# Patient Record
Sex: FEMALE | Race: WHITE | Employment: FULL TIME | URBAN - METROPOLITAN AREA
[De-identification: names, ages, dates, MRNs, and addresses within clinical notes are randomized per-mention and may not be internally consistent; named-entity substitution may affect disease eponyms.]

---
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
## 2018-04-30 ENCOUNTER — HOSPITAL ENCOUNTER (EMERGENCY)
Facility: CLINIC | Age: 18
Discharge: HOME | End: 2018-04-30
Attending: EMERGENCY MEDICINE | Admitting: EMERGENCY MEDICINE
Payer: COMMERCIAL

## 2018-04-30 ENCOUNTER — APPOINTMENT (OUTPATIENT)
Dept: GENERAL RADIOLOGY | Facility: CLINIC | Age: 18
End: 2018-04-30
Attending: EMERGENCY MEDICINE
Payer: COMMERCIAL

## 2018-04-30 VITALS
HEART RATE: 101 BPM | RESPIRATION RATE: 16 BRPM | TEMPERATURE: 97.8 F | OXYGEN SATURATION: 95 % | DIASTOLIC BLOOD PRESSURE: 81 MMHG | WEIGHT: 112 LBS | SYSTOLIC BLOOD PRESSURE: 121 MMHG | BODY MASS INDEX: 19.84 KG/M2

## 2018-04-30 DIAGNOSIS — R42 DIZZINESS: ICD-10-CM

## 2018-04-30 DIAGNOSIS — R07.89 ATYPICAL CHEST PAIN: ICD-10-CM

## 2018-04-30 LAB
ALBUMIN UR-MCNC: NEGATIVE MG/DL
AMORPH CRY #/AREA URNS HPF: ABNORMAL /HPF
AMPHETAMINES UR QL SCN: NEGATIVE
ANION GAP SERPL CALCULATED.3IONS-SCNC: 7 MMOL/L (ref 3–14)
APPEARANCE UR: ABNORMAL
BACTERIA #/AREA URNS HPF: ABNORMAL /HPF
BARBITURATES UR QL: NEGATIVE
BASOPHILS # BLD AUTO: 0.1 10E9/L (ref 0–0.2)
BASOPHILS NFR BLD AUTO: 0.8 %
BENZODIAZ UR QL: NEGATIVE
BILIRUB UR QL STRIP: NEGATIVE
BUN SERPL-MCNC: 15 MG/DL (ref 7–30)
CALCIUM SERPL-MCNC: 8.5 MG/DL (ref 8.5–10.1)
CANNABINOIDS UR QL SCN: NEGATIVE
CHLORIDE SERPL-SCNC: 106 MMOL/L (ref 94–109)
CO2 SERPL-SCNC: 26 MMOL/L (ref 20–32)
COCAINE UR QL: NEGATIVE
COLOR UR AUTO: ABNORMAL
CREAT SERPL-MCNC: 0.88 MG/DL (ref 0.52–1.04)
DIFFERENTIAL METHOD BLD: NORMAL
EOSINOPHIL # BLD AUTO: 0.1 10E9/L (ref 0–0.7)
EOSINOPHIL NFR BLD AUTO: 1.1 %
ERYTHROCYTE [DISTWIDTH] IN BLOOD BY AUTOMATED COUNT: 13 % (ref 10–15)
ETHANOL UR QL SCN: NEGATIVE
GFR SERPL CREATININE-BSD FRML MDRD: 82 ML/MIN/1.7M2
GLUCOSE SERPL-MCNC: 84 MG/DL (ref 70–99)
GLUCOSE UR STRIP-MCNC: NEGATIVE MG/DL
HCG UR QL: NEGATIVE
HCT VFR BLD AUTO: 40.9 % (ref 35–47)
HGB BLD-MCNC: 14.1 G/DL (ref 11.7–15.7)
HGB UR QL STRIP: ABNORMAL
IMM GRANULOCYTES # BLD: 0 10E9/L (ref 0–0.4)
IMM GRANULOCYTES NFR BLD: 0.1 %
KETONES UR STRIP-MCNC: NEGATIVE MG/DL
LEUKOCYTE ESTERASE UR QL STRIP: ABNORMAL
LYMPHOCYTES # BLD AUTO: 2.5 10E9/L (ref 0.8–5.3)
LYMPHOCYTES NFR BLD AUTO: 30.6 %
MCH RBC QN AUTO: 30.1 PG (ref 26.5–33)
MCHC RBC AUTO-ENTMCNC: 34.5 G/DL (ref 31.5–36.5)
MCV RBC AUTO: 87 FL (ref 78–100)
MONOCYTES # BLD AUTO: 0.6 10E9/L (ref 0–1.3)
MONOCYTES NFR BLD AUTO: 7.7 %
MUCOUS THREADS #/AREA URNS LPF: PRESENT /LPF
NEUTROPHILS # BLD AUTO: 4.9 10E9/L (ref 1.6–8.3)
NEUTROPHILS NFR BLD AUTO: 59.7 %
NITRATE UR QL: NEGATIVE
NRBC # BLD AUTO: 0 10*3/UL
NRBC BLD AUTO-RTO: 0 /100
OPIATES UR QL SCN: NEGATIVE
PH UR STRIP: 7.5 PH (ref 5–7)
PLATELET # BLD AUTO: 357 10E9/L (ref 150–450)
POTASSIUM SERPL-SCNC: 3.9 MMOL/L (ref 3.4–5.3)
RBC # BLD AUTO: 4.68 10E12/L (ref 3.8–5.2)
RBC #/AREA URNS AUTO: 36 /HPF (ref 0–2)
SODIUM SERPL-SCNC: 140 MMOL/L (ref 133–144)
SOURCE: ABNORMAL
SP GR UR STRIP: 1.02 (ref 1–1.03)
SQUAMOUS #/AREA URNS AUTO: 1 /HPF (ref 0–1)
TROPONIN I SERPL-MCNC: <0.015 UG/L (ref 0–0.04)
UROBILINOGEN UR STRIP-MCNC: NORMAL MG/DL (ref 0–2)
WBC # BLD AUTO: 8.3 10E9/L (ref 4–11)
WBC #/AREA URNS AUTO: 10 /HPF (ref 0–5)

## 2018-04-30 PROCEDURE — 81025 URINE PREGNANCY TEST: CPT | Performed by: EMERGENCY MEDICINE

## 2018-04-30 PROCEDURE — 99285 EMERGENCY DEPT VISIT HI MDM: CPT | Performed by: EMERGENCY MEDICINE

## 2018-04-30 PROCEDURE — 71046 X-RAY EXAM CHEST 2 VIEWS: CPT

## 2018-04-30 PROCEDURE — 85025 COMPLETE CBC W/AUTO DIFF WBC: CPT | Performed by: EMERGENCY MEDICINE

## 2018-04-30 PROCEDURE — 25000132 ZZH RX MED GY IP 250 OP 250 PS 637: Performed by: EMERGENCY MEDICINE

## 2018-04-30 PROCEDURE — 84484 ASSAY OF TROPONIN QUANT: CPT | Performed by: EMERGENCY MEDICINE

## 2018-04-30 PROCEDURE — 80307 DRUG TEST PRSMV CHEM ANLYZR: CPT | Performed by: EMERGENCY MEDICINE

## 2018-04-30 PROCEDURE — 99285 EMERGENCY DEPT VISIT HI MDM: CPT | Mod: 25 | Performed by: EMERGENCY MEDICINE

## 2018-04-30 PROCEDURE — 80307 DRUG TEST PRSMV CHEM ANLYZR: CPT | Mod: 59 | Performed by: EMERGENCY MEDICINE

## 2018-04-30 PROCEDURE — 80320 DRUG SCREEN QUANTALCOHOLS: CPT | Mod: 59 | Performed by: EMERGENCY MEDICINE

## 2018-04-30 PROCEDURE — 81001 URINALYSIS AUTO W/SCOPE: CPT | Performed by: EMERGENCY MEDICINE

## 2018-04-30 PROCEDURE — 93010 ELECTROCARDIOGRAM REPORT: CPT | Mod: Z6 | Performed by: EMERGENCY MEDICINE

## 2018-04-30 PROCEDURE — 80048 BASIC METABOLIC PNL TOTAL CA: CPT | Performed by: EMERGENCY MEDICINE

## 2018-04-30 PROCEDURE — 93005 ELECTROCARDIOGRAM TRACING: CPT | Performed by: EMERGENCY MEDICINE

## 2018-04-30 RX ORDER — ACETAMINOPHEN 500 MG
1000 TABLET ORAL ONCE
Status: COMPLETED | OUTPATIENT
Start: 2018-04-30 | End: 2018-04-30

## 2018-04-30 RX ADMIN — ACETAMINOPHEN 1000 MG: 500 TABLET, FILM COATED ORAL at 16:01

## 2018-04-30 ASSESSMENT — ENCOUNTER SYMPTOMS
PALPITATIONS: 1
NERVOUS/ANXIOUS: 1
DIZZINESS: 1
LIGHT-HEADEDNESS: 1

## 2018-04-30 NOTE — ED TRIAGE NOTES
dizzy,chest pain    arrives swaying to traige, denies taking any meds and drugs,  feels heart racing and has intense chest pain     History of depression and anxiety,  Is going to start new therapies but has been off med's since October

## 2018-04-30 NOTE — ED AVS SNAPSHOT
[facility], Emergency Department    [address] [address]    Phone:  [phone]                                       Lady Carey   MRN: [id number]    Department:  [facility], Emergency Department   Date of Visit:  4/30/2018           Patient Information     Date Of Birth          2000        Your diagnoses for this visit were:     Dizziness     Atypical chest pain        You were seen by Salvatore Lynne MD.      Follow-up Information     Follow up with [facility], [facility].    Specialty:  Clinic    Contact information:    [address] [address]          Discharge Instructions          *CHEST PAIN, UNCERTAIN CAUSE    Based on your exam today, the exact cause of your chest pain is not certain. Your condition does not seem serious at this time, and your pain does not appear to be coming from your heart. However, sometimes the signs of a serious problem take more time to appear. Therefore, watch for the warning signs listed below.  HOME CARE:    1. Rest today and avoid strenuous activity.  2. Take any prescribed medicine as directed.  FOLLOW UP with your doctor in 1-3 days.   GET PROMPT MEDICAL ATTENTION if any of the following occur:    A change in the type of pain: if it feels different, becomes more severe, lasts longer, or begins to spread into your shoulder, arm, neck, jaw or back    Shortness of breath or increased pain with breathing    Weakness, dizziness, or fainting    Cough with blood or dark colored sputum (phlegm)    Fever over 101  F (38.3  C)    Swelling, pain or redness in one leg    3114-9896 [employer]. [address] [place] [address], [place] [address]. All rights reserved. This information is not intended as a substitute for professional medical care. Always follow your healthcare professional's instructions.  This information has been modified by your health care provider with permission from the publisher.      Discharge  References/Attachments     DIZZINESS, UNCERTAIN CAUSE (ENGLISH)    DIZZINESS OR FAINTING, POSSIBLE CAUSES OF (ENGLISH)      24 Hour Appointment Hotline       To make an appointment at any [facility], call [phone] ([phone]). If you don't have a family doctor or clinic, we will help you find one. [place] clinics are conveniently located to serve the needs of you and your family.             Review of your medicines      Our records show that you are taking the medicines listed below. If these are incorrect, please call your family doctor or clinic.        Dose / Directions Last dose taken    ARIPiprazole 2 MG tablet   Commonly known as:  ABILIFY   Dose:  2.5 mg   Quantity:  45 tablet        Take 1.5 tablets (3 mg) by mouth daily   Refills:  1        fluvoxaMINE 100 MG tablet   Commonly known as:  LUVOX   Dose:  100 mg   Indication:  Depression   Quantity:  30 tablet        Take 1 tablet (100 mg) by mouth At Bedtime   Refills:  1        GABAPENTIN PO   Indication:  Does not know dose        Take by mouth At Bedtime   Refills:  0        hydrOXYzine 25 MG tablet   Commonly known as:  ATARAX   Dose:  25-50 mg   Quantity:  120 tablet        Take 1-2 tablets (25-50 mg) by mouth every 4 hours as needed for anxiety   Refills:  1        traZODone 50 MG tablet   Commonly known as:  DESYREL   Dose:  50 mg   Quantity:  90 tablet        Take 1 tablet (50 mg) by mouth At Bedtime   Refills:  1                Procedures and tests performed during your visit     Basic metabolic panel    CBC with platelets differential    Drug abuse screen 6 urine (tox)    EKG 12 lead    HCG qualitative urine    Orthostatic blood pressure and pulse    Troponin I    UA with Microscopic reflex to Culture    XR Chest 2 Views      Orders Needing Specimen Collection     None      Pending Results     Date and Time Order Name Status Description    4/30/2018 1535 Drug abuse screen 6 urine (tox) In process     4/30/2018 1427 XR Chest 2 Views  "Preliminary     2018 1326 EKG 12 lead Preliminary             Pending Culture Results     Date and Time Order Name Status Description    2018 1535 Drug abuse screen 6 urine (tox) In process             Pending Results Instructions     If you had any lab results that were not finalized at the time of your Discharge, you can call the ED Lab Result RN at [phone]. You will be contacted by this team for any positive Lab results or changes in treatment. The nurses are available 7 days a week from 10A to 6:30P.  You can leave a message 24 hours per day and they will return your call.        Thank you for choosing [facility]       Thank you for choosing [facility] for your care. Our goal is always to provide you with excellent care. Hearing back from our patients is one way we can continue to improve our services. Please take a few minutes to complete the written survey that you may receive in the mail after you visit with us. Thank you!        [employer] Information     [employer] lets you send messages to your doctor, view your test results, renew your prescriptions, schedule appointments and more. To sign up, go to [email or web address] . Click on \"Log in\" on the left side of the screen, which will take you to the Welcome page. Then click on \"Sign up Now\" on the right side of the page.     You will be asked to enter the access code listed below, as well as some personal information. Please follow the directions to create your username and password.     Your access code is: [email or web address]  Expires: 2018  5:13 PM     Your access code will  in 90 days. If you need help or a new code, please call your [facility] clinic or [phone].        Care EveryWhere ID     This is your Care EveryWhere ID. This could be used by other organizations to access your [facility] medical records  [id number]        Equal Access to Services     CLAUDIO WRIGHT AH: Yasmany Phelps, vladislav rooney, christina arroyo " abimael davis ah. So [facility] [phone].    ATENCIÓN: Si habla español, tiene a fu disposición servicios gratuitos de asistencia lingüística. Llame al [phone].    We comply with applicable federal civil rights laws and [place] laws. We do not discriminate on the basis of race, color, national origin, age, disability, sex, sexual orientation, or gender identity.            After Visit Summary       This is your record. Keep this with you and show to your community pharmacist(s) and doctor(s) at your next visit.

## 2018-04-30 NOTE — PROGRESS NOTES
"Emergency Social Work Services Note    Date of  Intervention: 04/30/18  Last Emergency Department Visit:  09/24/2017  Care Plan:  None  Collaborated with:  Bedside RN    Data:  JENNIFER consulted by bedside RN to speak with patient about insurance concerns.     Intervention:  JENNIFER met with patient and patient's partner at bedside in regard to insurance concern.     Assessment:  Patient is a 18 year old woman who presents to the ED with elevated heart rate and chest pain. RN requested SW to meet with patient due to financial concerns for ED visit. JENNIFER inquired into insurance with patient; she states she has insurance through the [employer] as she is a student. She is confirmed to have [place] of [place]. She expressed anxiety with co-pays and bills as she has been admitted to the hospital before, and she had \"a lot of medical bills\". SW discussed that there is a possibility with co-pays through insurance, but she does have insurance coverage.    JENNIFER encouraged her to speak with insurance company if any co pays are billed. She is agreeable to that plan.    Plan:    Anticipated Disposition:  Home, no needs identified    Barriers to d/c plan:  None    Follow Up:  Patient is encouraged to follow up with insurance company if copays are billed; as well as working out a payment plan if applicable.    Daphne RODRIGUEZ  4/30/2018 4:40 PM         "

## 2018-04-30 NOTE — ED PROVIDER NOTES
"    [facility] EMERGENCY DEPARTMENT ([facility])  4/30/18   Portalway F    History       Chief Complaint   Patient presents with     Chest Pain     HPI  Lady Carey is a 18 year old female with history of depression and anxiety who presents with centralized chest pain and palpitations.  Patient is here with her significant other.  She states  today at noon, she was walking to class with her significant other when she felt dizzy and lightheaded and was noted by her significant other to have an unsteady gait.  She denies any loss of consciousness.  She also had some palpitations and centralized chest pain associated with this which she describes as pressure like.  She states nothing makes the pain better or worse.  This chest pain is different from her typical brief episodes of chest pain which she describes as sharp and lasting approximately a couple of minutes.  She states that she has had these brief episodes of chest pain since she was a child.  She also reports she had similar near syncopal symptoms for a full week last fall, saw a doctor, and was unable to determine the etiology of her symptoms.  She states she does have a hormonal IUD in place, but denies any previous history of cigarette smoking or DVT/PE.  She states she is unsure if she has a family history of DVT/PE.  She states she currently feels like \"she does not exist like her brain is not there.\"  She otherwise currently denies any leg swelling or taking any analgesics for chest pain.    PAST MEDICAL HISTORY  Past Medical History:   Diagnosis Date     Anxiety      Depressive disorder      PAST SURGICAL HISTORY  History reviewed. No pertinent surgical history.  FAMILY HISTORY  No family history on file.  SOCIAL HISTORY  Social History   Substance Use Topics     Smoking status: Never Smoker     Smokeless tobacco: Never Used     Alcohol use 0.6 oz/week     1 Shots of liquor per week      Comment: 4-6 drinks/month     MEDICATIONS  No current " facility-administered medications for this encounter.      Current Outpatient Prescriptions   Medication     ARIPiprazole (ABILIFY) 2 MG tablet     fluvoxaMINE (LUVOX) 100 MG tablet     GABAPENTIN PO     hydrOXYzine (ATARAX) 25 MG tablet     traZODone (DESYREL) 50 MG tablet     ALLERGIES  Allergies   Allergen Reactions     Augmentin Rash         I have reviewed the Medications, Allergies, Past Medical and Surgical History, and Social History in the Epic system.    Review of Systems   Cardiovascular: Positive for chest pain (centralized) and palpitations. Negative for leg swelling.   Neurological: Positive for dizziness and light-headedness. Negative for syncope.   Psychiatric/Behavioral: The patient is nervous/anxious.    All other systems reviewed and are negative.      Physical Exam   BP: 120/70  Pulse: 101  Heart Rate: 53  Temp: 97.8  F (36.6  C)  Resp: 18  Weight: 50.8 kg (112 lb)  SpO2: 97 %  Lying Orthostatic BP: 104/76  Lying Orthostatic Pulse: 87 bpm  Sitting Orthostatic BP: 114/73  Sitting Orthostatic Pulse: 91 bpm  Standing Orthostatic BP: 109/74  Standing Orthostatic Pulse: 103 bpm      Physical Exam   Constitutional: No distress.   HENT:   Head: Atraumatic.   Mouth/Throat: Oropharynx is clear and moist. No oropharyngeal exudate.   Eyes: Pupils are equal, round, and reactive to light. No scleral icterus.   Cardiovascular: Normal heart sounds and intact distal pulses.    Pulmonary/Chest: Breath sounds normal. No respiratory distress.   Abdominal: Soft. Bowel sounds are normal. There is no tenderness.   Musculoskeletal: She exhibits no edema or tenderness.   Skin: Skin is warm. No rash noted. She is not diaphoretic.       ED Course     ED Course     Procedures   2:05 PM  The patient was seen and examined by Dr. Lynne in [facility] .                EKG Interpretation:      Interpreted by Salvatore Lynne  Time reviewed: 1:44 PM  Symptoms at time of EKG: Chest pain, dizziness   Rhythm: normal sinus   Rate:  normal  Axis: normal  Ectopy: none  Conduction: normal  ST Segments/ T Waves: No ST-T wave changes  Q Waves: none  Comparison to prior: No old EKG available    Clinical Impression: normal EKG    Results for orders placed or performed during the hospital encounter of 04/30/18 (from the past 48 hour(s))   EKG 12 lead   Result Value Ref Range    Interpretation ECG Click View Image link to view waveform and result    CBC with platelets differential   Result Value Ref Range    WBC 8.3 4.0 - 11.0 10e9/L    RBC Count 4.68 3.8 - 5.2 10e12/L    Hemoglobin 14.1 11.7 - 15.7 g/dL    Hematocrit 40.9 35.0 - 47.0 %    MCV 87 78 - 100 fl    MCH 30.1 26.5 - 33.0 pg    MCHC 34.5 31.5 - 36.5 g/dL    RDW 13.0 10.0 - 15.0 %    Platelet Count 357 150 - 450 10e9/L    Diff Method Automated Method     % Neutrophils 59.7 %    % Lymphocytes 30.6 %    % Monocytes 7.7 %    % Eosinophils 1.1 %    % Basophils 0.8 %    % Immature Granulocytes 0.1 %    Nucleated RBCs 0 0 /100    Absolute Neutrophil 4.9 1.6 - 8.3 10e9/L    Absolute Lymphocytes 2.5 0.8 - 5.3 10e9/L    Absolute Monocytes 0.6 0.0 - 1.3 10e9/L    Absolute Eosinophils 0.1 0.0 - 0.7 10e9/L    Absolute Basophils 0.1 0.0 - 0.2 10e9/L    Abs Immature Granulocytes 0.0 0 - 0.4 10e9/L    Absolute Nucleated RBC 0.0    Basic metabolic panel   Result Value Ref Range    Sodium 140 133 - 144 mmol/L    Potassium 3.9 3.4 - 5.3 mmol/L    Chloride 106 94 - 109 mmol/L    Carbon Dioxide 26 20 - 32 mmol/L    Anion Gap 7 3 - 14 mmol/L    Glucose 84 70 - 99 mg/dL    Urea Nitrogen 15 7 - 30 mg/dL    Creatinine 0.88 0.52 - 1.04 mg/dL    GFR Estimate 82 >60 mL/min/1.7m2    GFR Estimate If Black >90 >60 mL/min/1.7m2    Calcium 8.5 8.5 - 10.1 mg/dL   Troponin I   Result Value Ref Range    Troponin I ES <0.015 0.000 - 0.045 ug/L   XR Chest 2 Views    Impression    Impression: No acute cardiopulmonary abnormality.   HCG qualitative urine   Result Value Ref Range    HCG Qual Urine Negative NEG^Negative   UA with  Microscopic reflex to Culture   Result Value Ref Range    Color Urine Light Yellow     Appearance Urine Slightly Cloudy     Glucose Urine Negative NEG^Negative mg/dL    Bilirubin Urine Negative NEG^Negative    Ketones Urine Negative NEG^Negative mg/dL    Specific Gravity Urine 1.016 1.003 - 1.035    Blood Urine Moderate (A) NEG^Negative    pH Urine 7.5 (H) 5.0 - 7.0 pH    Protein Albumin Urine Negative NEG^Negative mg/dL    Urobilinogen mg/dL Normal 0.0 - 2.0 mg/dL    Nitrite Urine Negative NEG^Negative    Leukocyte Esterase Urine Small (A) NEG^Negative    Source Urine     WBC Urine 10 (H) 0 - 5 /HPF    RBC Urine 36 (H) 0 - 2 /HPF    Bacteria Urine Few (A) NEG^Negative /HPF    Squamous Epithelial /HPF Urine 1 0 - 1 /HPF    Mucous Urine Present (A) NEG^Negative /LPF    Amorphous Crystals Few (A) NEG^Negative /HPF           Critical Care time:  none             Labs Ordered and Resulted from Time of ED Arrival Up to the Time of Departure from the ED   ROUTINE UA WITH MICROSCOPIC REFLEX TO CULTURE - Abnormal; Notable for the following:        Result Value    Blood Urine Moderate (*)     pH Urine 7.5 (*)     Leukocyte Esterase Urine Small (*)     WBC Urine 10 (*)     RBC Urine 36 (*)     Bacteria Urine Few (*)     Mucous Urine Present (*)     Amorphous Crystals Few (*)     All other components within normal limits   CBC WITH PLATELETS DIFFERENTIAL   BASIC METABOLIC PANEL   TROPONIN I   HCG QUALITATIVE URINE   DRUG ABUSE SCREEN 6 CHEM DEP URINE ([facility])   ORTHOSTATIC BLOOD PRESSURE AND PULSE   ICE TO AFFECTED AREA            Assessments & Plan (with Medical Decision Making)   18-year-old female presents for evaluation of dizziness in the setting of chest pain and palpitations.  Differential includes acute coronary syndrome, arrhythmia, pulmonary embolus, other.  Initial exam revealed entirely normal vital signs.  Laboratories including CBC, basic metabolic panel and troponin were unremarkable.  Chest x-ray and EKG were  normal.  Urinalysis was normal and urine toxicology renewed revealed alcohol.  She was PERC negative without risk factors for pulmonary embolus making pulmonary embolus unlikely.  Given normal EKG and troponin as well as normal sinus rhythm observed on monitor for multiple hours, was felt that cardiac etiology for his symptoms was less likely as well.  Patient was observed multiple hours and felt improved.  She will be discharged with instructions on symptomatic treatment and to follow-up with the primary care providers.    I have reviewed the nursing notes.    I have reviewed the findings, diagnosis, plan and need for follow up with the patient.    New Prescriptions    No medications on file       Final diagnoses:   Dizziness   Atypical chest pain     IDelgado, am serving as a trained medical scribe to document services personally performed by Dontae Lynne MD, based on the provider's statements to me.      IDontae MD, was physically present and have reviewed and verified the accuracy of this note documented by Delgado Gann.     4/30/2018   [facility], [place], EMERGENCY DEPARTMENT     Salvatore Lynne MD  04/30/18 4048

## 2018-04-30 NOTE — ED AVS SNAPSHOT
[facility], [place], Emergency Department    [address] [place] [address]    Phone:  [phone]                                       Lady Carey   MRN: [id number]    Department:  [facility], Emergency Department   Date of Visit:  4/30/2018           After Visit Summary Signature Page     I have received my discharge instructions, and my questions have been answered. I have discussed any challenges I see with this plan with the nurse or doctor.    ..........................................................................................................................................  Patient/Patient Representative Signature      ..........................................................................................................................................  Patient Representative Print Name and Relationship to Patient    ..................................................               ................................................  Date                                            Time    ..........................................................................................................................................  Reviewed by Signature/Title    ...................................................              ..............................................  Date                                                            Time

## 2018-04-30 NOTE — DISCHARGE INSTRUCTIONS
*CHEST PAIN, UNCERTAIN CAUSE    Based on your exam today, the exact cause of your chest pain is not certain. Your condition does not seem serious at this time, and your pain does not appear to be coming from your heart. However, sometimes the signs of a serious problem take more time to appear. Therefore, watch for the warning signs listed below.  HOME CARE:    1. Rest today and avoid strenuous activity.  2. Take any prescribed medicine as directed.  FOLLOW UP with your doctor in 1-3 days.   GET PROMPT MEDICAL ATTENTION if any of the following occur:    A change in the type of pain: if it feels different, becomes more severe, lasts longer, or begins to spread into your shoulder, arm, neck, jaw or back    Shortness of breath or increased pain with breathing    Weakness, dizziness, or fainting    Cough with blood or dark colored sputum (phlegm)    Fever over 101  F (38.3  C)    Swelling, pain or redness in one leg    8781-7538 [employer]. [address] [place] [address]. All rights reserved. This information is not intended as a substitute for professional medical care. Always follow your healthcare professional's instructions.  This information has been modified by your health care provider with permission from the publisher.

## 2018-05-01 LAB — INTERPRETATION ECG - MUSE: NORMAL

## 2020-06-24 ENCOUNTER — APPOINTMENT (OUTPATIENT)
Dept: CT IMAGING | Facility: CLINIC | Age: 20
End: 2020-06-24
Attending: EMERGENCY MEDICINE
Payer: COMMERCIAL

## 2020-06-24 ENCOUNTER — HOSPITAL ENCOUNTER (EMERGENCY)
Facility: CLINIC | Age: 20
Discharge: HOME | End: 2020-06-24
Attending: EMERGENCY MEDICINE | Admitting: EMERGENCY MEDICINE
Payer: COMMERCIAL

## 2020-06-24 VITALS
OXYGEN SATURATION: 100 % | DIASTOLIC BLOOD PRESSURE: 82 MMHG | HEART RATE: 118 BPM | SYSTOLIC BLOOD PRESSURE: 124 MMHG | TEMPERATURE: 98.5 F

## 2020-06-24 DIAGNOSIS — N12 PYELONEPHRITIS: ICD-10-CM

## 2020-06-24 LAB
ALBUMIN SERPL-MCNC: 3.5 G/DL (ref 3.4–5)
ALBUMIN UR-MCNC: NEGATIVE MG/DL
ALP SERPL-CCNC: 68 U/L (ref 40–150)
ALT SERPL W P-5'-P-CCNC: 23 U/L (ref 0–50)
ANION GAP SERPL CALCULATED.3IONS-SCNC: 9 MMOL/L (ref 3–14)
APPEARANCE UR: CLEAR
AST SERPL W P-5'-P-CCNC: 21 U/L (ref 0–45)
B-HCG FREE SERPL-ACNC: <5 IU/L
BACTERIA #/AREA URNS HPF: ABNORMAL /HPF
BASOPHILS # BLD AUTO: 0 10E9/L (ref 0–0.2)
BASOPHILS NFR BLD AUTO: 0.2 %
BILIRUB SERPL-MCNC: 0.6 MG/DL (ref 0.2–1.3)
BILIRUB UR QL STRIP: NEGATIVE
BUN SERPL-MCNC: 9 MG/DL (ref 7–30)
CALCIUM SERPL-MCNC: 9.2 MG/DL (ref 8.5–10.1)
CHLORIDE SERPL-SCNC: 99 MMOL/L (ref 94–109)
CO2 SERPL-SCNC: 22 MMOL/L (ref 20–32)
COLOR UR AUTO: ABNORMAL
CREAT SERPL-MCNC: 0.89 MG/DL (ref 0.52–1.04)
DIFFERENTIAL METHOD BLD: ABNORMAL
EOSINOPHIL # BLD AUTO: 0 10E9/L (ref 0–0.7)
EOSINOPHIL NFR BLD AUTO: 0 %
ERYTHROCYTE [DISTWIDTH] IN BLOOD BY AUTOMATED COUNT: 13 % (ref 10–15)
GFR SERPL CREATININE-BSD FRML MDRD: >90 ML/MIN/{1.73_M2}
GLUCOSE SERPL-MCNC: 163 MG/DL (ref 70–99)
GLUCOSE UR STRIP-MCNC: 50 MG/DL
HCT VFR BLD AUTO: 42.4 % (ref 35–47)
HGB BLD-MCNC: 13.9 G/DL (ref 11.7–15.7)
HGB UR QL STRIP: ABNORMAL
IMM GRANULOCYTES # BLD: 0.2 10E9/L (ref 0–0.4)
IMM GRANULOCYTES NFR BLD: 1.4 %
KETONES UR STRIP-MCNC: NEGATIVE MG/DL
LACTATE BLD-SCNC: 1.5 MMOL/L (ref 0.7–2)
LEUKOCYTE ESTERASE UR QL STRIP: ABNORMAL
LIPASE SERPL-CCNC: 35 U/L (ref 73–393)
LYMPHOCYTES # BLD AUTO: 0.3 10E9/L (ref 0.8–5.3)
LYMPHOCYTES NFR BLD AUTO: 2.1 %
MCH RBC QN AUTO: 30.4 PG (ref 26.5–33)
MCHC RBC AUTO-ENTMCNC: 32.8 G/DL (ref 31.5–36.5)
MCV RBC AUTO: 93 FL (ref 78–100)
MONOCYTES # BLD AUTO: 1.1 10E9/L (ref 0–1.3)
MONOCYTES NFR BLD AUTO: 6.7 %
NEUTROPHILS # BLD AUTO: 14.3 10E9/L (ref 1.6–8.3)
NEUTROPHILS NFR BLD AUTO: 89.6 %
NITRATE UR QL: NEGATIVE
NRBC # BLD AUTO: 0 10*3/UL
NRBC BLD AUTO-RTO: 0 /100
PH UR STRIP: 6.5 PH (ref 5–7)
PLATELET # BLD AUTO: 278 10E9/L (ref 150–450)
POTASSIUM SERPL-SCNC: 3.5 MMOL/L (ref 3.4–5.3)
PROT SERPL-MCNC: 8.2 G/DL (ref 6.8–8.8)
RBC # BLD AUTO: 4.57 10E12/L (ref 3.8–5.2)
RBC #/AREA URNS AUTO: <1 /HPF (ref 0–2)
SODIUM SERPL-SCNC: 130 MMOL/L (ref 133–144)
SOURCE: ABNORMAL
SP GR UR STRIP: 1.02 (ref 1–1.03)
SQUAMOUS #/AREA URNS AUTO: 3 /HPF (ref 0–1)
UROBILINOGEN UR STRIP-MCNC: NORMAL MG/DL (ref 0–2)
WBC # BLD AUTO: 15.9 10E9/L (ref 4–11)
WBC #/AREA URNS AUTO: 17 /HPF (ref 0–5)

## 2020-06-24 PROCEDURE — 93005 ELECTROCARDIOGRAM TRACING: CPT

## 2020-06-24 PROCEDURE — 81001 URINALYSIS AUTO W/SCOPE: CPT | Performed by: EMERGENCY MEDICINE

## 2020-06-24 PROCEDURE — 84702 CHORIONIC GONADOTROPIN TEST: CPT

## 2020-06-24 PROCEDURE — 83690 ASSAY OF LIPASE: CPT | Performed by: EMERGENCY MEDICINE

## 2020-06-24 PROCEDURE — 96375 TX/PRO/DX INJ NEW DRUG ADDON: CPT

## 2020-06-24 PROCEDURE — 25000128 H RX IP 250 OP 636: Performed by: EMERGENCY MEDICINE

## 2020-06-24 PROCEDURE — 25800030 ZZH RX IP 258 OP 636: Performed by: EMERGENCY MEDICINE

## 2020-06-24 PROCEDURE — 85025 COMPLETE CBC W/AUTO DIFF WBC: CPT | Performed by: EMERGENCY MEDICINE

## 2020-06-24 PROCEDURE — 83605 ASSAY OF LACTIC ACID: CPT | Performed by: EMERGENCY MEDICINE

## 2020-06-24 PROCEDURE — 87086 URINE CULTURE/COLONY COUNT: CPT | Performed by: EMERGENCY MEDICINE

## 2020-06-24 PROCEDURE — 74177 CT ABD & PELVIS W/CONTRAST: CPT

## 2020-06-24 PROCEDURE — 25000125 ZZHC RX 250: Performed by: EMERGENCY MEDICINE

## 2020-06-24 PROCEDURE — 80053 COMPREHEN METABOLIC PANEL: CPT | Performed by: EMERGENCY MEDICINE

## 2020-06-24 PROCEDURE — 87186 SC STD MICRODIL/AGAR DIL: CPT | Performed by: EMERGENCY MEDICINE

## 2020-06-24 PROCEDURE — 99285 EMERGENCY DEPT VISIT HI MDM: CPT | Mod: 25

## 2020-06-24 PROCEDURE — 87040 BLOOD CULTURE FOR BACTERIA: CPT | Mod: XS | Performed by: EMERGENCY MEDICINE

## 2020-06-24 PROCEDURE — 96365 THER/PROPH/DIAG IV INF INIT: CPT

## 2020-06-24 PROCEDURE — 87088 URINE BACTERIA CULTURE: CPT | Performed by: EMERGENCY MEDICINE

## 2020-06-24 PROCEDURE — 96361 HYDRATE IV INFUSION ADD-ON: CPT

## 2020-06-24 RX ORDER — ONDANSETRON 4 MG/1
4 TABLET, ORALLY DISINTEGRATING ORAL EVERY 8 HOURS PRN
Qty: 10 TABLET | Refills: 0 | Status: SHIPPED | OUTPATIENT
Start: 2020-06-24 | End: 2020-08-11

## 2020-06-24 RX ORDER — KETOROLAC TROMETHAMINE 15 MG/ML
15 INJECTION, SOLUTION INTRAMUSCULAR; INTRAVENOUS ONCE
Status: COMPLETED | OUTPATIENT
Start: 2020-06-24 | End: 2020-06-24

## 2020-06-24 RX ORDER — IOPAMIDOL 755 MG/ML
500 INJECTION, SOLUTION INTRAVASCULAR ONCE
Status: COMPLETED | OUTPATIENT
Start: 2020-06-24 | End: 2020-06-24

## 2020-06-24 RX ORDER — CEPHALEXIN 500 MG/1
500 CAPSULE ORAL 4 TIMES DAILY
Qty: 52 CAPSULE | Refills: 0 | Status: SHIPPED | OUTPATIENT
Start: 2020-06-25 | End: 2020-08-11

## 2020-06-24 RX ORDER — CEFTRIAXONE 1 G/1
1 INJECTION, POWDER, FOR SOLUTION INTRAMUSCULAR; INTRAVENOUS ONCE
Status: COMPLETED | OUTPATIENT
Start: 2020-06-24 | End: 2020-06-24

## 2020-06-24 RX ADMIN — CEFTRIAXONE 1 G: 1 INJECTION, POWDER, FOR SOLUTION INTRAMUSCULAR; INTRAVENOUS at 21:02

## 2020-06-24 RX ADMIN — SODIUM CHLORIDE 500 ML: 9 INJECTION, SOLUTION INTRAVENOUS at 21:03

## 2020-06-24 RX ADMIN — IOPAMIDOL 57 ML: 755 INJECTION, SOLUTION INTRAVENOUS at 20:03

## 2020-06-24 RX ADMIN — SODIUM CHLORIDE 1000 ML: 9 INJECTION, SOLUTION INTRAVENOUS at 19:26

## 2020-06-24 RX ADMIN — SODIUM CHLORIDE 54 ML: 9 INJECTION, SOLUTION INTRAVENOUS at 20:05

## 2020-06-24 RX ADMIN — KETOROLAC TROMETHAMINE 15 MG: 15 INJECTION, SOLUTION INTRAMUSCULAR; INTRAVENOUS at 19:27

## 2020-06-24 ASSESSMENT — ENCOUNTER SYMPTOMS
COUGH: 0
HEMATURIA: 0
BLOOD IN STOOL: 0
SHORTNESS OF BREATH: 1
FEVER: 0
DYSURIA: 0
ABDOMINAL PAIN: 1
DIARRHEA: 0
VOMITING: 1
FREQUENCY: 0

## 2020-06-24 NOTE — LETTER
June 24, 2020      To Whom It May Concern:      Phil Quezada was seen in our Emergency Department today, 06/24/20.  I expect her condition to improve over the next 2 days.  She may return to work when improved.        Sincerely,        Zeyad Reynolds RN
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
## 2020-06-24 NOTE — ED TRIAGE NOTES
"Monday at work, started feeling abdominal pain in front and back. The next day felt weak, vomiting and abdominal pain continued, but started feeling better. Was tested for COVID that day, a/w results. Today, woke up feeling \"3 times worse and I was Drenched in sweat and the entire right side of my body felt like it was on fire. Head feels like a bunch of lettuce from [employer] that on fire.\" Sore throat after throwing up on Tuesday, but now just feels dry.   "

## 2020-06-24 NOTE — LETTER
June 24, 2020      To Whom It May Concern:      Phil Quezada was seen in our Emergency Department today, 06/24/20.  I expect her condition to improve over the next 2 days.  She may return to work/school when improved.      Sincerely,        Zeyad Reynolds RN

## 2020-06-24 NOTE — ED AVS SNAPSHOT
[facility] Emergency Department  [address]  [facility] [address]  Phone:  [phone]  Fax:  [phone]                                    Phil Quezada   MRN: [id number]    Department:  [facility] Emergency Department   Date of Visit:  6/24/2020           After Visit Summary Signature Page    I have received my discharge instructions, and my questions have been answered. I have discussed any challenges I see with this plan with the nurse or doctor.    ..........................................................................................................................................  Patient/Patient Representative Signature      ..........................................................................................................................................  Patient Representative Print Name and Relationship to Patient    ..................................................               ................................................  Date                                   Time    ..........................................................................................................................................  Reviewed by Signature/Title    ...................................................              ..............................................  Date                                               Time          22EPIC Rev 08/18

## 2020-06-25 LAB — INTERPRETATION ECG - MUSE: NORMAL

## 2020-06-25 NOTE — RESULT ENCOUNTER NOTE
Emergency Dept/Urgent Care discharge antibiotic (if prescribed): Cephalexin (Keflex) 500 mg capsule, 1 capsule (500 mg) by mouth 4 times daily for 13 days.  Date of Rx (if applicable):  6/24/20  No changes in treatment per Urine culture protocol.

## 2020-06-26 LAB
BACTERIA SPEC CULT: ABNORMAL
Lab: ABNORMAL
SPECIMEN SOURCE: ABNORMAL

## 2020-06-30 LAB
BACTERIA SPEC CULT: NO GROWTH
BACTERIA SPEC CULT: NO GROWTH
SPECIMEN SOURCE: NORMAL
SPECIMEN SOURCE: NORMAL

## 2020-07-01 ENCOUNTER — TELEPHONE (OUTPATIENT)
Dept: EMERGENCY MEDICINE | Facility: CLINIC | Age: 20
End: 2020-07-01

## 2020-07-01 NOTE — TELEPHONE ENCOUNTER
"[facility] Emergency Department/Urgent Care Lab result notification [Positive for uti and bacteria is susceptible to antibiotic]:    Reason for call:   Notify of Final urine culture result, confirm patient is taking antibiotic, assess symptoms, and advise per Emergency Dept/Urgent Care discharge instructions and Emergency Dept urine culture protocol.    Lab Result & Date of Final Report [copied from Result Note]:    Final Urine Culture Report on 6/26/20   Emergency Dept/Urgent Care discharge antibiotic prescribed: Cephalexin (Keflex) 500 mg capsule, 1 capsule (500 mg) by mouth 4 times daily for 13 days.   #1. Bacteria, 50,000 to 100,000 colonies/ml Escherichia coli, is SUSCEPTIBLE to Antibiotic.     As per [facility] ED Lab Result protocol, no change in antibiotic therapy.     Current symptoms (include time patient called):    4:07PM; Spoke with patient. She states all of her symptoms are gone, just has some \"lingering bit of kidney pain.\" States she is about 1/2 way through her antibiotic.     Recommendations/Instructions:   Patient notified of lab result and treatment recommendation.   Take antibiotic as directed by the Emergency Dept/Urgent Care Provider.  Advised to follow up with the PCP as directed by the ED provider.  The patient is comfortable with the information given and has no further questions.     UTI prevention/education reviewed with patient [Yes/No]:  Yes    Please contact you PCP or return to the Emergency Department if your:    Symptoms worsen or other concerning symptoms    Starla Kessler RN  [employer] RN  Lung Nodule and ED Lab Result RN  Epic pool (ED late result f/u RN): [id number]  [facility] INCIDENTAL RADIOLOGY F/U NURSES: [id number]  # [phone]            "

## 2020-08-11 ENCOUNTER — HOSPITAL ENCOUNTER (EMERGENCY)
Facility: CLINIC | Age: 20
End: 2020-08-11
Payer: COMMERCIAL

## 2020-08-11 ENCOUNTER — HOSPITAL ENCOUNTER (EMERGENCY)
Facility: CLINIC | Age: 20
Discharge: HOME | End: 2020-08-11
Attending: EMERGENCY MEDICINE | Admitting: EMERGENCY MEDICINE
Payer: COMMERCIAL

## 2020-08-11 VITALS
OXYGEN SATURATION: 98 % | HEART RATE: 98 BPM | SYSTOLIC BLOOD PRESSURE: 150 MMHG | RESPIRATION RATE: 16 BRPM | DIASTOLIC BLOOD PRESSURE: 109 MMHG | TEMPERATURE: 98.2 F

## 2020-08-11 DIAGNOSIS — K20.90 ESOPHAGITIS: ICD-10-CM

## 2020-08-11 PROCEDURE — 99283 EMERGENCY DEPT VISIT LOW MDM: CPT

## 2020-08-11 PROCEDURE — 93005 ELECTROCARDIOGRAM TRACING: CPT

## 2020-08-11 RX ORDER — PANTOPRAZOLE SODIUM 40 MG/1
40 TABLET, DELAYED RELEASE ORAL DAILY
Qty: 30 TABLET | Refills: 0 | Status: SHIPPED | OUTPATIENT
Start: 2020-08-11 | End: 2020-09-10

## 2020-08-11 RX ORDER — SUCRALFATE ORAL 1 G/10ML
1 SUSPENSION ORAL 4 TIMES DAILY PRN
Qty: 420 ML | Refills: 0 | Status: ON HOLD | OUTPATIENT
Start: 2020-08-11 | End: 2021-08-15

## 2020-08-11 NOTE — ED AVS SNAPSHOT
[facility] Emergency Department  [address]  [facility] [address]  Phone:  [phone]  Fax:  [phone]                                    Phil Quezada   MRN: [id number]    Department:  [facility] Emergency Department   Date of Visit:  8/11/2020           After Visit Summary Signature Page    I have received my discharge instructions, and my questions have been answered. I have discussed any challenges I see with this plan with the nurse or doctor.    ..........................................................................................................................................  Patient/Patient Representative Signature      ..........................................................................................................................................  Patient Representative Print Name and Relationship to Patient    ..................................................               ................................................  Date                                   Time    ..........................................................................................................................................  Reviewed by Signature/Title    ...................................................              ..............................................  Date                                               Time          22EPIC Rev 08/18

## 2020-08-12 LAB — INTERPRETATION ECG - MUSE: NORMAL

## 2020-08-12 ASSESSMENT — ENCOUNTER SYMPTOMS
FEVER: 0
SHORTNESS OF BREATH: 0

## 2020-08-12 NOTE — ED TRIAGE NOTES
Pt c/o chest pain on/off since yesterday.  States a bit better today and gets worse she she eats and drinks.

## 2020-08-12 NOTE — ED PROVIDER NOTES
History     Chief Complaint:  Chest Pain    The history is provided by the patient.      Phil Quezada is a 20 year old female who presents with sharp and burning chest pain to the middle of her sternum which lasted for 4-5 hours last night and has been intermittent throughout the day today. Her pain is exacerbated with eating and drinking. Here, she also mentions concern for high blood pressure, and her BP is noted to be mildly high here in ED. The patient does consume alcohol fairly regularly. She denies any fevers or shortness of breath.    Allergies:  Augmentin    Medications:    Hydroxyzine  Nortriptyline  Birth control    Past Medical History:      Alcohol use      Anxiety      Cluster B personality disorder      Depressive disorder      Past Surgical History:    [place] tooth extraction    Family History:    History reviewed. No pertinent family history.     Social History:  The patient was not accompanied to the ED.  Smoking Status: Never smoker  Smokeless Tobacco: Never used  Alcohol Use: Yes  Drug Use: No  Marital Status:  Single    Review of Systems   Constitutional: Negative for fever.   Respiratory: Negative for shortness of breath.    Cardiovascular: Positive for chest pain (sharp, burning, in middle of sternum).   All other systems reviewed and are negative.    Physical Exam     Patient Vitals for the past 24 hrs:   BP Temp Heart Rate Resp SpO2   08/11/20 2119 150/109 98.2  F (36.8  C) 98 16 98 %     Physical Exam  Nursing note and vitals reviewed.  Constitutional: Cooperative.   HENT:   Mouth/Throat: Mucous membranes are normal.   Cardiovascular: Normal rate, regular rhythm and normal heart sounds.  No murmur.  Pulmonary/Chest: Effort normal and breath sounds normal. No respiratory distress. No wheezes.    Abdominal: Soft. Normal appearance and bowel sounds are normal. No distension. There is no tenderness. There is no rigidity and no guarding.   Neurological: Alert. Oriented x4  Skin: Skin is warm  and dry.   Psychiatric: Normal mood and affect.     Emergency Department Course     ECG:  Indication: Chest pain  Completed at 2128.  Sinus rhythm  Normal ECG  Rate 100 bpm. ND interval 134. QRS duration 94. QT/QTc 338/436. P-R-T axes 35 17 1.  No significant change when compared to EKG dated 6/24/2020.  Agree with computer interpretation.    Emergency Department Course:  Past medical records, nursing notes, and vitals reviewed.    2316 I performed an exam of the patient as documented above.     2343 I rechecked the patient and discussed the results of her workup thus far.     Findings and plan explained to the Patient. Patient discharged home with instructions regarding supportive care, medications, and reasons to return. The importance of close follow-up was reviewed. The patient was prescribed Protonix and Carafate.    I personally answered all related questions prior to discharge.     Impression & Plan     Medical Decision Making:  Phil Quezada is a 20 year old female who presents for evaluation of epigastric abdominal pain.  I considered a broad differential diagnosis for this patient including gastritis, GERD, cholecystitis, choledocholithiasis, biliary colic, pancreatitis, colonic or small bowel pathology, vascular etiologies including aneurysm, ulcer.  Signs and symptoms here are consistent with esophagitis. There are no signs of any serious etiologies as mentioned above or intraabdominal catastrophes.  I highly doubt this is a PE or acute coronary syndrome and as she is tender in the abdomen would not do any further workup for this.  Doubt perforated ulcer at this point.  Will refer back to primary and do scheduled medication for stomach acid reduction x 14 days.  Consider endoscopy if further symptoms despite this.  Encouraged avoidance of alcohol.  Esophagitis precautions given for home.      Diagnosis:    ICD-10-CM    1. Esophagitis  K20.9        Disposition:  Discharged to home.    Discharge  Medications:  Discharge Medication List as of 8/11/2020 11:44 PM      START taking these medications    Details   pantoprazole (PROTONIX) 40 MG EC tablet Take 1 tablet (40 mg) by mouth daily for 30 doses, Disp-30 tablet,R-0, Local Print      sucralfate (CARAFATE) 1 GM/10ML suspension Take 10 mLs (1 g) by mouth 4 times daily as needed (Esophagitis pain), Disp-420 mL,R-0, Local Print             Scribe Disclosure:  I, Dalila Bhandari, am serving as a scribe at 11:16 PM on 8/11/2020 to document services personally performed by Mario Castillo MD based on my observations and the provider's statements to me.     Dalila Bhandari  8/11/2020   [facility] EMERGENCY DEPARTMENT       Mario Castillo MD  08/12/20 0237

## 2020-08-19 ENCOUNTER — NURSE TRIAGE (OUTPATIENT)
Dept: NURSING | Facility: CLINIC | Age: 20
End: 2020-08-19

## 2020-08-20 NOTE — TELEPHONE ENCOUNTER
"\"I've mary having chest and \"heart pain\" for 10 days now. It's worse at night and worse when I take a deep breath in.\"  Denies other sx.  Triaged and advised ER.  Luna Zhao RN [facility]    COVID 19 Nurse Triage Plan/Patient Instructions    Please be aware that novel coronavirus (COVID-19) may be circulating in the community. If you develop symptoms such as fever, cough, or SOB or if you have concerns about the presence of another infection including coronavirus (COVID-19), please contact your health care provider or visit [email or web address].     Disposition/Instructions    ED Visit recommended. Follow protocol based instructions.     Bring Your Own Device:  Please also bring your smart device(s) (smart phones, tablets, laptops) and their charging cables for your personal use and to communicate with your care team during your visit.    Thank you for taking steps to prevent the spread of this virus.  o Limit your contact with others.  o Wear a simple mask to cover your cough.  o Wash your hands well and often.    Resources    [facility]: About COVID-19: [email or web address]    CDC: What to Do If You're Sick: www.cdc.gov/coronavirus/2019-ncov/about/steps-when-sick.html    CDC: Ending Home Isolation: www.cdc.gov/coronavirus/2019-ncov/hcp/disposition-in-home-patients.html     CDC: Caring for Someone: www.cdc.gov/coronavirus/2019-ncov/if-you-are-sick/care-for-someone.html     [facility]: Interim Guidance for Hospital Discharge to Home: www.health.[facility]/diseases/coronavirus/hcp/hospdischarge.pdf    [facility] clinical trials (COVID-19 research studies): clinicalaffairs.[email or web address].edu/um-clinical-trials     Below are the COVID-19 hotlines at the [place] [facility] ([facility]). Interpreters are available.   o For health questions: Call 549-142-1741 or 1-675.823.9467 (7 a.m. to 7 p.m.)  o For questions about schools and childcare: Call 120-168-8579 or 1-439.587.6109 (7 a.m. to 7 " p.m.)                     Reason for Disposition    Taking a deep breath makes pain worse    Additional Information    Negative: Chest pain lasts > 5 minutes (Exceptions: chest pain occurring > 3 days ago and now asymptomatic; same as previously diagnosed heartburn and has accompanying sour taste in mouth)    Protocols used: CHEST PAIN-A-AH

## 2021-02-21 ENCOUNTER — OFFICE VISIT (OUTPATIENT)
Dept: URGENT CARE | Facility: URGENT CARE | Age: 21
End: 2021-02-21
Payer: COMMERCIAL

## 2021-02-21 VITALS
SYSTOLIC BLOOD PRESSURE: 144 MMHG | OXYGEN SATURATION: 99 % | BODY MASS INDEX: 21.26 KG/M2 | RESPIRATION RATE: 20 BRPM | HEART RATE: 105 BPM | WEIGHT: 120 LBS | DIASTOLIC BLOOD PRESSURE: 100 MMHG | TEMPERATURE: 98.6 F

## 2021-02-21 DIAGNOSIS — S09.90XA TRAUMATIC INJURY OF HEAD, INITIAL ENCOUNTER: Primary | ICD-10-CM

## 2021-02-21 DIAGNOSIS — S00.03XA CONTUSION OF SCALP, INITIAL ENCOUNTER: ICD-10-CM

## 2021-02-21 PROCEDURE — 99203 OFFICE O/P NEW LOW 30 MIN: CPT | Performed by: PHYSICIAN ASSISTANT

## 2021-02-21 RX ORDER — DESVENLAFAXINE 50 MG/1
100 TABLET, FILM COATED, EXTENDED RELEASE ORAL DAILY
Status: ON HOLD | COMMUNITY
Start: 2021-02-20 | End: 2021-08-15

## 2021-02-21 RX ORDER — KETAMINE HYDROCHLORIDE 100 MG/ML
INJECTION INTRAMUSCULAR; INTRAVENOUS AT BEDTIME
Status: ON HOLD | COMMUNITY
End: 2021-08-15

## 2021-02-21 RX ORDER — NORETHINDRONE ACETATE AND ETHINYL ESTRADIOL 1MG-20(21)
1 KIT ORAL
Status: ON HOLD | COMMUNITY
Start: 2020-06-15 | End: 2021-08-15

## 2021-02-21 RX ORDER — HYDROXYZINE PAMOATE 25 MG/1
25-50 CAPSULE ORAL 2 TIMES DAILY PRN
COMMUNITY
Start: 2020-04-01

## 2021-02-21 ASSESSMENT — ENCOUNTER SYMPTOMS: WOUND: 1

## 2021-02-21 NOTE — LETTER
February 21, 2021      Phil Quezada  [address]  [facility] [address]        To Whom It May Concern:    Phil Quezada was seen in our clinic. She may return to work without restriction on 2/23/2021.      Sincerely,        Renee Montiel PA-C

## 2021-02-22 NOTE — PROGRESS NOTES
"SUBJECTIVE:   Phil Quezada is a 20 year old female presenting with a chief complaint of   Chief Complaint   Patient presents with     Head Injury     hit right side of head on a desk last night, having HA,nausea, and \"trouble talking\" since this     She is a new patient of [facility].  Patient presents with complaints of right side head pain after hitting head on desk from sitting position.  No LOC, no overt bleeding.  No vomiting, some nausea.  No vision problems.  Friend here, acting normal.  No other injury.    Social:  Had etoh prior to head injury.        Review of Systems   Skin: Positive for wound.   All other systems reviewed and are negative.      Past Medical History:   Diagnosis Date     Alcohol use      Anxiety      Cluster B personality disorder      Depressive disorder      History reviewed. No pertinent family history.  Current Outpatient Medications   Medication Sig Dispense Refill     desvenlafaxine (PRISTIQ) 50 MG 24 hr tablet        hydrOXYzine (VISTARIL) 25 MG capsule TAKE 1 CAPSULE BY MOUTH EVERY 8 HOURS AS NEEDED FOR ANXIETY.       ketamine 100 mg/mL (KETALAR) 100 MG/ML intranasal solution Apply into one nostril as directed At Bedtime       norethindrone-ethinyl estradiol (BLISOVI FE 1/20) 1-20 MG-MCG tablet Take 1 tablet by mouth       NORTRIPTYLINE HCL PO        sucralfate (CARAFATE) 1 GM/10ML suspension Take 10 mLs (1 g) by mouth 4 times daily as needed (Esophagitis pain) 420 mL 0     UNABLE TO FIND MEDICATION NAME: birth control       hydrOXYzine (ATARAX) 25 MG tablet Take 1-2 tablets (25-50 mg) by mouth every 4 hours as needed for anxiety (Patient not taking: Reported on 2/21/2021) 120 tablet 1     Social History     Tobacco Use     Smoking status: Never Smoker     Smokeless tobacco: Never Used   Substance Use Topics     Alcohol use: Yes     Alcohol/week: 1.0 standard drinks     Types: 1 Shots of liquor per week     Comment: 4-6 drinks/month       OBJECTIVE  BP (!) 144/100   Pulse 105   " Temp 98.6  F (37  C)   Resp 20   Wt 54.4 kg (120 lb)   LMP  (LMP Unknown)   SpO2 99%   BMI 21.26 kg/m      Physical Exam  Vitals signs and nursing note reviewed.   Constitutional:       Appearance: Normal appearance. She is normal weight.   HENT:      Head: Normocephalic.      Comments: Right side temple very small  Edema with tenderness to palpation.    Eyes:      Extraocular Movements: Extraocular movements intact.      Conjunctiva/sclera: Conjunctivae normal.   Neck:      Musculoskeletal: Normal range of motion.   Cardiovascular:      Rate and Rhythm: Normal rate and regular rhythm.      Pulses: Normal pulses.      Heart sounds: Normal heart sounds.   Pulmonary:      Effort: Pulmonary effort is normal.      Breath sounds: Normal breath sounds.   Neurological:      General: No focal deficit present.      Mental Status: She is alert.      Cranial Nerves: No cranial nerve deficit.      Sensory: No sensory deficit.      Motor: No weakness.      Coordination: Coordination normal.      Gait: Gait normal.   Psychiatric:         Mood and Affect: Mood normal.         Labs:  No results found for this or any previous visit (from the past 24 hour(s)).    X-Ray was not done.    ASSESSMENT:      ICD-10-CM    1. Traumatic injury of head, initial encounter  S09.90XA    2. Contusion of scalp, initial encounter  S00.03XA         Medical Decision Making:    Differential Diagnosis:  Head InjuryMild head injury    Serious Comorbid Conditions:  Adult:  as above    PLAN:    Symptomatic care.      Followup:    If not improving or if condition worsens, follow up with your Primary Care Provider, If not improving or if conditions worsens over the next 12-24 hours, go to the Emergency Department    Patient Instructions     Patient Education     * Head Injury, no wake-up (Adult)    You have a head injury. It doesn t look serious right now. But symptoms of a more serious problem may appear later. This could be a mild brain injury  (concussion), or bruising or bleeding in the brain. You or someone caring for you will need to watch for the symptoms listed below for at least the next 24 hours. When you get home, be sure to follow any care instructions you re given.  Home care  Watch for the following symptoms:  Call 9-1-1 if you have any of these symptoms over the next hours or days:  1. Severe headache or headache that gets worse  2. Nausea and repeated throwing up (vomiting)  3. Dizziness or changes in eyesight (vision changes)  4. Bothered by bright light or loud noise  5. Sleep changes (trouble falling asleep or unusually sleepy or groggy)  6. Changes in the way you act or talk (personality or speech changes)  7. Feeling confused or forgetting things (memory loss)  8. Trouble walking or clumsiness  9. Passing out or fainting (even for a short time)  10. Won t wake up  11. Stiff neck  12. Weakness or numbness in any part of the body  13. Seizures  Do you have signs of a concussion?  A concussion is an injury to the brain caused by shaking. If you were knocked out, that s a sign you may have a concussion. But watch for these signs, too:    Upset stomach (nausea)    Throwing up (vomiting)    Feeling dizzy or confused    Headache    Loss of memory  If you have any of the above signs:    Don t return to sports or any activity where you might hurt your head again.    Wait until all symptoms have been gone for 2 full weeks, and your doctor has cleared you to return to sports.  You could get a serious brain injury if you get hurt again before fully recovering.  General care    You don t need to stay awake or be woken during the night.    For pain, you may use:  ? Tylenol (acetaminophen) 650 to 1000 mg every 6 hours OR  ? Motrin or Advil (ibuprofen) 600 mg every 6 to 8 hours with food  NOTE: If you have chronic liver or kidney disease or ever had a stomach ulcer or GI bleeding, talk with your doctor before using these medicines.    Don t take aspirin  after a head injury.    For swelling and to help with pain: Put a cold source to the injured area for up to 20 minutes at a time. Do this as often as directed. Use a cold pack or bag of ice wrapped in a thin towel. Never put a cold source directly on the skin.    For cuts and scrapes: Care for them as the doctor or nurse directed.    For the next 24 hours (or longer, if your doctor advises it):  ? Don t drink alcohol, use sedatives, or use other medicines that make you sleepy.  ? Don t drive or use large machines.  ? Don t do anything tiring, such as heavy lifting or straining.  ? Limit tasks where you need to focus or concentrate. This includes reading, using a smartphone or computer, watching TV and playing video games.  ? Don t return to sports or other activities that could cause another head injury.  Follow-up care  Follow up with your doctor if symptoms don t get better after 24 hours, or as directed.  When to call the doctor  Call your doctor right away if any of these occur:    Pain doesn t get better or gets worse    New or increased swelling or bruising    Fever of 100.4 F (38 C) or higher, or as directed by your doctor    Increased redness, warmth, draining or bleeding from the injury    Fluid drainage or bleeding from the nose or ears    Any pushed-in spot or bony bump in the injured area  This information has been modified by your health care provider with permission from the publisher.  Modifications clinically reviewed by Joey Laird DO, MBA, CHLOE, Director of Physician Informatics for Emergency Medicine, [facility] on 8/20/18.  For informational purposes only. Not to replace the advice of your health care provider.  Copyright   2018 [facility]. All rights reserved.

## 2021-02-22 NOTE — PATIENT INSTRUCTIONS
Patient Education     * Head Injury, no wake-up (Adult)    You have a head injury. It doesn t look serious right now. But symptoms of a more serious problem may appear later. This could be a mild brain injury (concussion), or bruising or bleeding in the brain. You or someone caring for you will need to watch for the symptoms listed below for at least the next 24 hours. When you get home, be sure to follow any care instructions you re given.  Home care  Watch for the following symptoms:  Call 9-1-1 if you have any of these symptoms over the next hours or days:  1. Severe headache or headache that gets worse  2. Nausea and repeated throwing up (vomiting)  3. Dizziness or changes in eyesight (vision changes)  4. Bothered by bright light or loud noise  5. Sleep changes (trouble falling asleep or unusually sleepy or groggy)  6. Changes in the way you act or talk (personality or speech changes)  7. Feeling confused or forgetting things (memory loss)  8. Trouble walking or clumsiness  9. Passing out or fainting (even for a short time)  10. Won t wake up  11. Stiff neck  12. Weakness or numbness in any part of the body  13. Seizures  Do you have signs of a concussion?  A concussion is an injury to the brain caused by shaking. If you were knocked out, that s a sign you may have a concussion. But watch for these signs, too:    Upset stomach (nausea)    Throwing up (vomiting)    Feeling dizzy or confused    Headache    Loss of memory  If you have any of the above signs:    Don t return to sports or any activity where you might hurt your head again.    Wait until all symptoms have been gone for 2 full weeks, and your doctor has cleared you to return to sports.  You could get a serious brain injury if you get hurt again before fully recovering.  General care    You don t need to stay awake or be woken during the night.    For pain, you may use:  ? Tylenol (acetaminophen) 650 to 1000 mg every 6 hours OR  ? Motrin or Advil  (ibuprofen) 600 mg every 6 to 8 hours with food  NOTE: If you have chronic liver or kidney disease or ever had a stomach ulcer or GI bleeding, talk with your doctor before using these medicines.    Don t take aspirin after a head injury.    For swelling and to help with pain: Put a cold source to the injured area for up to 20 minutes at a time. Do this as often as directed. Use a cold pack or bag of ice wrapped in a thin towel. Never put a cold source directly on the skin.    For cuts and scrapes: Care for them as the doctor or nurse directed.    For the next 24 hours (or longer, if your doctor advises it):  ? Don t drink alcohol, use sedatives, or use other medicines that make you sleepy.  ? Don t drive or use large machines.  ? Don t do anything tiring, such as heavy lifting or straining.  ? Limit tasks where you need to focus or concentrate. This includes reading, using a smartphone or computer, watching TV and playing video games.  ? Don t return to sports or other activities that could cause another head injury.  Follow-up care  Follow up with your doctor if symptoms don t get better after 24 hours, or as directed.  When to call the doctor  Call your doctor right away if any of these occur:    Pain doesn t get better or gets worse    New or increased swelling or bruising    Fever of 100.4 F (38 C) or higher, or as directed by your doctor    Increased redness, warmth, draining or bleeding from the injury    Fluid drainage or bleeding from the nose or ears    Any pushed-in spot or bony bump in the injured area  This information has been modified by your health care provider with permission from the publisher.  Modifications clinically reviewed by Joey Laird DO, MBA, CHLOE, Director of Physician Informatics for Emergency Medicine, [facility] on 8/20/18.  For informational purposes only. Not to replace the advice of your health care provider.  Copyright   2018 [facility]. All rights  reserved.

## 2021-08-13 ENCOUNTER — HOSPITAL ENCOUNTER (EMERGENCY)
Facility: CLINIC | Age: 21
Discharge: LEFT BEFORE DISCHARGE | End: 2021-08-13
Attending: EMERGENCY MEDICINE | Admitting: EMERGENCY MEDICINE
Payer: COMMERCIAL

## 2021-08-13 VITALS
HEART RATE: 136 BPM | OXYGEN SATURATION: 99 % | SYSTOLIC BLOOD PRESSURE: 149 MMHG | TEMPERATURE: 98.4 F | DIASTOLIC BLOOD PRESSURE: 103 MMHG | RESPIRATION RATE: 20 BRPM

## 2021-08-13 PROCEDURE — 999N000104 HC STATISTIC NO CHARGE

## 2021-08-13 PROCEDURE — 93005 ELECTROCARDIOGRAM TRACING: CPT

## 2021-08-13 RX ORDER — SODIUM CHLORIDE 9 MG/ML
INJECTION, SOLUTION INTRAVENOUS CONTINUOUS
Status: DISCONTINUED | OUTPATIENT
Start: 2021-08-13 | End: 2021-08-13

## 2021-08-14 ENCOUNTER — HOSPITAL ENCOUNTER (INPATIENT)
Facility: CLINIC | Age: 21
LOS: 1 days | Discharge: HOME | End: 2021-08-15
Attending: EMERGENCY MEDICINE | Admitting: HOSPITALIST
Payer: COMMERCIAL

## 2021-08-14 ENCOUNTER — NURSE TRIAGE (OUTPATIENT)
Dept: NURSING | Facility: CLINIC | Age: 21
End: 2021-08-14

## 2021-08-14 DIAGNOSIS — F33.42 MAJOR DEPRESSIVE DISORDER, RECURRENT EPISODE, IN FULL REMISSION (H): ICD-10-CM

## 2021-08-14 DIAGNOSIS — N30.00 ACUTE CYSTITIS WITHOUT HEMATURIA: Primary | ICD-10-CM

## 2021-08-14 DIAGNOSIS — Z11.52 ENCOUNTER FOR SCREENING LABORATORY TESTING FOR SEVERE ACUTE RESPIRATORY SYNDROME CORONAVIRUS 2 (SARS-COV-2): ICD-10-CM

## 2021-08-14 DIAGNOSIS — G90.81 SEROTONIN SYNDROME: ICD-10-CM

## 2021-08-14 LAB
ALBUMIN SERPL-MCNC: 4 G/DL (ref 3.4–5)
ALBUMIN UR-MCNC: 20 MG/DL
ALP SERPL-CCNC: 86 U/L (ref 40–150)
ALT SERPL W P-5'-P-CCNC: 246 U/L (ref 0–50)
AMPHETAMINES UR QL SCN: ABNORMAL
ANION GAP SERPL CALCULATED.3IONS-SCNC: 7 MMOL/L (ref 3–14)
APPEARANCE UR: CLEAR
AST SERPL W P-5'-P-CCNC: 233 U/L (ref 0–45)
BACTERIA #/AREA URNS HPF: ABNORMAL /HPF
BARBITURATES UR QL: ABNORMAL
BASOPHILS # BLD AUTO: 0.1 10E3/UL (ref 0–0.2)
BASOPHILS NFR BLD AUTO: 1 %
BENZODIAZ UR QL: ABNORMAL
BILIRUB SERPL-MCNC: 0.4 MG/DL (ref 0.2–1.3)
BILIRUB UR QL STRIP: NEGATIVE
BUN SERPL-MCNC: 11 MG/DL (ref 7–30)
CALCIUM SERPL-MCNC: 9.1 MG/DL (ref 8.5–10.1)
CANNABINOIDS UR QL SCN: ABNORMAL
CHLORIDE BLD-SCNC: 102 MMOL/L (ref 94–109)
CO2 SERPL-SCNC: 24 MMOL/L (ref 20–32)
COCAINE UR QL: ABNORMAL
COLOR UR AUTO: ABNORMAL
CREAT SERPL-MCNC: 0.67 MG/DL (ref 0.52–1.04)
EOSINOPHIL # BLD AUTO: 0.2 10E3/UL (ref 0–0.7)
EOSINOPHIL NFR BLD AUTO: 2 %
ERYTHROCYTE [DISTWIDTH] IN BLOOD BY AUTOMATED COUNT: 12.4 % (ref 10–15)
GFR SERPL CREATININE-BSD FRML MDRD: >90 ML/MIN/1.73M2
GLUCOSE BLD-MCNC: 81 MG/DL (ref 70–99)
GLUCOSE UR STRIP-MCNC: NEGATIVE MG/DL
HCT VFR BLD AUTO: 44.3 % (ref 35–47)
HGB BLD-MCNC: 14.9 G/DL (ref 11.7–15.7)
HGB UR QL STRIP: ABNORMAL
HOLD SPECIMEN: NORMAL
HOLD SPECIMEN: NORMAL
IMM GRANULOCYTES # BLD: 0 10E3/UL
IMM GRANULOCYTES NFR BLD: 0 %
KETONES UR STRIP-MCNC: 10 MG/DL
LEUKOCYTE ESTERASE UR QL STRIP: ABNORMAL
LYMPHOCYTES # BLD AUTO: 1.2 10E3/UL (ref 0.8–5.3)
LYMPHOCYTES NFR BLD AUTO: 13 %
MCH RBC QN AUTO: 30 PG (ref 26.5–33)
MCHC RBC AUTO-ENTMCNC: 33.6 G/DL (ref 31.5–36.5)
MCV RBC AUTO: 89 FL (ref 78–100)
MONOCYTES # BLD AUTO: 0.4 10E3/UL (ref 0–1.3)
MONOCYTES NFR BLD AUTO: 5 %
MUCOUS THREADS #/AREA URNS LPF: PRESENT /LPF
NEUTROPHILS # BLD AUTO: 7.7 10E3/UL (ref 1.6–8.3)
NEUTROPHILS NFR BLD AUTO: 79 %
NITRATE UR QL: NEGATIVE
NRBC # BLD AUTO: 0 10E3/UL
NRBC BLD AUTO-RTO: 0 /100
OPIATES UR QL SCN: ABNORMAL
PH UR STRIP: 6 [PH] (ref 5–7)
PLATELET # BLD AUTO: 256 10E3/UL (ref 150–450)
POTASSIUM BLD-SCNC: 3.8 MMOL/L (ref 3.4–5.3)
PROT SERPL-MCNC: 7.8 G/DL (ref 6.8–8.8)
RBC # BLD AUTO: 4.97 10E6/UL (ref 3.8–5.2)
RBC URINE: 7 /HPF
SARS-COV-2 RNA RESP QL NAA+PROBE: NEGATIVE
SODIUM SERPL-SCNC: 133 MMOL/L (ref 133–144)
SP GR UR STRIP: 1.02 (ref 1–1.03)
SQUAMOUS EPITHELIAL: 1 /HPF
TRANSITIONAL EPI: 1 /HPF
UROBILINOGEN UR STRIP-MCNC: NORMAL MG/DL
WBC # BLD AUTO: 9.7 10E3/UL (ref 4–11)
WBC URINE: 32 /HPF

## 2021-08-14 PROCEDURE — U0003 INFECTIOUS AGENT DETECTION BY NUCLEIC ACID (DNA OR RNA); SEVERE ACUTE RESPIRATORY SYNDROME CORONAVIRUS 2 (SARS-COV-2) (CORONAVIRUS DISEASE [COVID-19]), AMPLIFIED PROBE TECHNIQUE, MAKING USE OF HIGH THROUGHPUT TECHNOLOGIES AS DESCRIBED BY CMS-2020-01-R: HCPCS | Performed by: EMERGENCY MEDICINE

## 2021-08-14 PROCEDURE — 80307 DRUG TEST PRSMV CHEM ANLYZR: CPT | Performed by: EMERGENCY MEDICINE

## 2021-08-14 PROCEDURE — 85004 AUTOMATED DIFF WBC COUNT: CPT | Performed by: EMERGENCY MEDICINE

## 2021-08-14 PROCEDURE — 99285 EMERGENCY DEPT VISIT HI MDM: CPT | Mod: 25 | Performed by: EMERGENCY MEDICINE

## 2021-08-14 PROCEDURE — 81001 URINALYSIS AUTO W/SCOPE: CPT | Performed by: EMERGENCY MEDICINE

## 2021-08-14 PROCEDURE — C9803 HOPD COVID-19 SPEC COLLECT: HCPCS | Performed by: EMERGENCY MEDICINE

## 2021-08-14 PROCEDURE — 84443 ASSAY THYROID STIM HORMONE: CPT | Performed by: HOSPITALIST

## 2021-08-14 PROCEDURE — 120N000002 HC R&B MED SURG/OB UMMC

## 2021-08-14 PROCEDURE — 93005 ELECTROCARDIOGRAM TRACING: CPT | Performed by: EMERGENCY MEDICINE

## 2021-08-14 PROCEDURE — 82040 ASSAY OF SERUM ALBUMIN: CPT | Performed by: EMERGENCY MEDICINE

## 2021-08-14 PROCEDURE — 250N000013 HC RX MED GY IP 250 OP 250 PS 637: Performed by: EMERGENCY MEDICINE

## 2021-08-14 PROCEDURE — 93010 ELECTROCARDIOGRAM REPORT: CPT | Performed by: EMERGENCY MEDICINE

## 2021-08-14 PROCEDURE — 36415 COLL VENOUS BLD VENIPUNCTURE: CPT | Performed by: EMERGENCY MEDICINE

## 2021-08-14 PROCEDURE — 87086 URINE CULTURE/COLONY COUNT: CPT | Performed by: EMERGENCY MEDICINE

## 2021-08-14 PROCEDURE — 258N000003 HC RX IP 258 OP 636: Performed by: EMERGENCY MEDICINE

## 2021-08-14 RX ORDER — LORAZEPAM 1 MG/1
2 TABLET ORAL ONCE
Status: COMPLETED | OUTPATIENT
Start: 2021-08-14 | End: 2021-08-14

## 2021-08-14 RX ORDER — CIPROFLOXACIN 500 MG/1
500 TABLET, FILM COATED ORAL ONCE
Status: COMPLETED | OUTPATIENT
Start: 2021-08-14 | End: 2021-08-14

## 2021-08-14 RX ORDER — SODIUM CHLORIDE 9 MG/ML
INJECTION, SOLUTION INTRAVENOUS CONTINUOUS
Status: DISCONTINUED | OUTPATIENT
Start: 2021-08-14 | End: 2021-08-15

## 2021-08-14 RX ADMIN — SODIUM CHLORIDE 1000 ML: 9 INJECTION, SOLUTION INTRAVENOUS at 20:48

## 2021-08-14 RX ADMIN — LORAZEPAM 2 MG: 1 TABLET ORAL at 23:31

## 2021-08-14 RX ADMIN — CIPROFLOXACIN 500 MG: 500 TABLET, FILM COATED ORAL at 23:31

## 2021-08-14 ASSESSMENT — ENCOUNTER SYMPTOMS
FEVER: 0
DIAPHORESIS: 1
HALLUCINATIONS: 1
DIZZINESS: 1
LIGHT-HEADEDNESS: 1
FATIGUE: 1
VOMITING: 1
WEAKNESS: 1

## 2021-08-14 NOTE — ED NOTES
Pt states auditory and visual hallucinations after switching from generic to brand name Pristiq. Pt states also on nortriptyline. Pt expresses concern for possible serotonin syndrome Pt friend states the Pt had brief syncopal episode.  Pt reportedly was talking and was standing by her bed the other day and stepped back and fell back on the bed and went to sleep. Pt states no knowledge of the event.

## 2021-08-14 NOTE — TELEPHONE ENCOUNTER
"Patient's partner called and writer obtained verbal consent from patient to speak to partner.  Patient has been having symptoms for 9 days after switching to name brand Pristiq.  She states she is having auditory and visual hallucinations, weakness and she did faint per partner.  They are worried she has \"Serotonin Syndrome\".  Patient is reluctant to talk to psychiatrist who orders her medications.  They feel like she should be \"medically monitored\".  Writer referred them to [facility].  Patient and partner verbalized understanding and agrees with plan.     Paz Parmar RN  [facility] Nurse Advisor  2:21 PM 8/14/2021   "

## 2021-08-14 NOTE — ED PROVIDER NOTES
"ED Provider Note  [facility]      History     Chief Complaint   Patient presents with     Medication Reaction     \"I think I have serotonin syndrome,\" onset one week ago with hallucinations, weakness, dizziness, recent change from generic prestique to brand name prestique 2 weeks ago.     The history is provided by the patient and medical records.     Phil Quezada is a 21 year old female with a past medical history significant for CORNELIO, depressive disorder, PTSD, suicidal ideation, cluster B personality disorder, and alcohol use disorder who presents with emergency Department for evaluation of a medication reaction.  Patient uses they/them pronouns.  Patient reports that they have been on 100 mg Pristiq for the last 8 months with no issue.  They state that 2 weeks ago their prescription was changed from the generic to the brand name Pristiq.  Patient reports that \"everything is insane now\" and that they feel like they are on drugs all the time.  They state that they have had episodes of hallucinations, weakness, dizziness, lightheadedness, fatigue, problems with balance and walking.  Patient endorses one episode of syncope.  Patient states that the symptoms have been worsening over the past 2 weeks.  Patient's girlfriend was present for MD visit and said that she has noticed that the patient cannot keep a straight line of thought over the past 2 weeks.  Patient reports that yesterday they woke up and their girlfriend said that they had been hallucinating all day and the patient did not remember this. Patient states that they have been eating and drinking normally.  They report that they vomited 1x in the past 2 weeks.  Patient states that they feel hot all of the time and that they are sweating a lot.  No fevers. Patient states that she feels like she is drunk all of the time.  She reports that she drinks alcohol a few days a week.  She last drank 2 days ago.  Patient's LMP is unknown " because they take oral contraceptives so they only have occasional spotting. Patient states that they went to urgent care yesterday, but the wait was too long so they went home.  Patient has been taking nortriptyline for the past 1.5 years.  Patient has been taking ketamine for the last 10 months.  They state that they have a prescription for 6 sprays of ketamine at a time, but that they only do 2-3 sprays once every 2-3 days.  Patient also takes hydroxyzine as needed.  Patient takes ibuprofen approximately once every 2 weeks for headaches.  Patient states that they did research and thinks that they might have serotonin syndrome.  They report that they have never had serotonin syndrome before.      Past Medical History  Past Medical History:   Diagnosis Date     Alcohol use      Anxiety      Cluster B personality disorder      Depressive disorder      Past Surgical History:   Procedure Laterality Date     ENT SURGERY       desvenlafaxine (PRISTIQ) 50 MG 24 hr tablet  ketamine 100 mg/mL (KETALAR) 100 MG/ML intranasal solution  norethindrone-ethinyl estradiol (BLISOVI FE 1/20) 1-20 MG-MCG tablet  NORTRIPTYLINE HCL PO  hydrOXYzine (ATARAX) 25 MG tablet  hydrOXYzine (VISTARIL) 25 MG capsule  sucralfate (CARAFATE) 1 GM/10ML suspension  UNABLE TO FIND      Allergies   Allergen Reactions     Augmentin Rash     Family History  No family history on file.  Social History   Social History     Tobacco Use     Smoking status: Never Smoker     Smokeless tobacco: Never Used   Substance Use Topics     Alcohol use: Yes     Alcohol/week: 1.0 standard drinks     Types: 1 Shots of liquor per week     Comment: 4-6 drinks/month     Drug use: No      Past medical history, past surgical history, medications, allergies, family history, and social history were reviewed with the patient. No additional pertinent items.       Review of Systems   Constitutional: Positive for diaphoresis and fatigue. Negative for fever.   Gastrointestinal:  Positive for vomiting (1x).   Musculoskeletal: Positive for gait problem.   Neurological: Positive for dizziness, syncope (1x), weakness and light-headedness.   Psychiatric/Behavioral: Positive for hallucinations.   All other systems reviewed and are negative.    A complete review of systems was performed with pertinent positives and negatives noted in the HPI, and all other systems negative.    Physical Exam   BP: (!) 138/93  Pulse: (!) 144  Temp: 97.3  F (36.3  C)  Resp: 16  SpO2: 99 %  Physical Exam  Constitutional:       General: She is not in acute distress.     Appearance: She is well-developed. She is diaphoretic. She is not ill-appearing.   HENT:      Head: Normocephalic and atraumatic.      Comments: sweat noted on forehead and in the upper lip.     Mouth/Throat:      Mouth: Mucous membranes are moist.   Eyes:      Extraocular Movements: Extraocular movements intact.      Conjunctiva/sclera: Conjunctivae normal.      Pupils: Pupils are equal, round, and reactive to light.      Comments: No nystagmus.   Cardiovascular:      Rate and Rhythm: Regular rhythm. Tachycardia present.      Heart sounds: Normal heart sounds.   Pulmonary:      Effort: Pulmonary effort is normal. No respiratory distress.      Breath sounds: Normal breath sounds.   Abdominal:      General: There is no distension.      Palpations: Abdomen is soft.      Tenderness: There is no abdominal tenderness. There is no rebound.   Musculoskeletal:         General: No tenderness.      Cervical back: Normal range of motion.   Skin:     General: Skin is warm.   Neurological:      Mental Status: She is alert and oriented to person, place, and time.      Deep Tendon Reflexes: Reflexes abnormal.      Reflex Scores:       Patellar reflexes are 3+ on the right side and 3+ on the left side.     Comments: + clonus   Psychiatric:         Behavior: Behavior normal.         Thought Content: Thought content normal.         ED Course     6:46 PM  The patient was  seen and examined by Dalia Gomez MD in Room ED08.   Procedures            EKG Interpretation:      Interpreted by Dalia Gomez MD  Time reviewed: 1845  Symptoms at time of EKG: anxious   Rhythm: tachycardic  Rate: 130  Axis: normal  Ectopy: none  Conduction: normal  ST Segments/ T Waves: No ST-T wave changes  Q Waves: none  Comparison to prior: No old EKG available    Clinical Impression: normal EKG        The medical record was reviewed and interpreted.  Current labs reviewed and interpreted.  Previous labs reviewed and interpreted.       Results for orders placed or performed during the hospital encounter of 08/14/21   EKG 12-lead, tracing only     Status: None (Preliminary result)   Result Value Ref Range    Systolic Blood Pressure  mmHg    Diastolic Blood Pressure  mmHg    Ventricular Rate 127 BPM    Atrial Rate 127 BPM    WA Interval 148 ms    QRS Duration 82 ms     ms    QTc 424 ms    P Axis 28 degrees    R AXIS 15 degrees    T Axis -3 degrees    Interpretation ECG Sinus tachycardia  Otherwise normal ECG        Medications   0.9% sodium chloride BOLUS (has no administration in time range)     Followed by   sodium chloride 0.9% infusion (has no administration in time range)        Assessments & Plan (with Medical Decision Making)   Patient is a 21-year-old female who presented to the ER due to having her Pristiq dose change and concern for possible serotonin syndrome.  Patient was anxious on exam and has been noted intermittent episodes of delusions or loss of reality.  Patient was having some clonus on exam is having hyperreflexia in her lower extremities.  Was noted to be tachycardic on exam and on EKG.  Labs are essentially normal.  She is concurrently on nortriptyline as well as the Pristiq.  This combination puts you at increased risk for having serotonin syndrome.  Due to patient having persistent symptoms I recommend admission for monitoring.  Patient was given some Ativan for  symptomatic control.  Was given to the triage doctor on call Dr. RAMÍREZ Hansen.  Will be admit to internal medicine at [facility] for further care    I have reviewed the nursing notes. I have reviewed the findings, diagnosis, plan and need for follow up with the patient.    New Prescriptions    No medications on file       Final diagnoses:   Serotonin syndrome     I, Shraddha Worthington, am serving as a trained medical scribe to document services personally performed by Dalia Gomez MD, based on the provider's statements to me.     I, Dalia Gomez MD, was physically present and have reviewed and verified the accuracy of this note documented by Shraddha Worthington.  --  Dalia Gomez MD  [facility] EMERGENCY DEPARTMENT  8/14/2021     Dalia Gomez MD  08/15/21 0113

## 2021-08-14 NOTE — ED TRIAGE NOTES
Pt states auditory and visual hallucinations after switching from generic to brand name Pristiq. Pt states also on nortriptyline. Pt expresses concern for possible serotonin syndrome. Pt denies SI/HI. ABCs intact GCS 15

## 2021-08-14 NOTE — ED NOTES
Pt called twice and noted to not be in triage despite staff looking for her. Pt did not speak with staff prior to leaving.

## 2021-08-15 VITALS
OXYGEN SATURATION: 98 % | BODY MASS INDEX: 24.64 KG/M2 | RESPIRATION RATE: 18 BRPM | DIASTOLIC BLOOD PRESSURE: 116 MMHG | WEIGHT: 139.1 LBS | TEMPERATURE: 99.2 F | HEART RATE: 117 BPM | SYSTOLIC BLOOD PRESSURE: 151 MMHG

## 2021-08-15 LAB
ALBUMIN SERPL-MCNC: 3.2 G/DL (ref 3.4–5)
ALP SERPL-CCNC: 74 U/L (ref 40–150)
ALT SERPL W P-5'-P-CCNC: 167 U/L (ref 0–50)
AST SERPL W P-5'-P-CCNC: 127 U/L (ref 0–45)
ATRIAL RATE - MUSE: 127 BPM
BACTERIA UR CULT: ABNORMAL
BILIRUB DIRECT SERPL-MCNC: 0.1 MG/DL (ref 0–0.2)
BILIRUB SERPL-MCNC: 0.6 MG/DL (ref 0.2–1.3)
CK SERPL-CCNC: 82 U/L (ref 30–225)
DIASTOLIC BLOOD PRESSURE - MUSE: NORMAL MMHG
INTERPRETATION ECG - MUSE: NORMAL
P AXIS - MUSE: 28 DEGREES
PR INTERVAL - MUSE: 148 MS
PROT SERPL-MCNC: 6.4 G/DL (ref 6.8–8.8)
QRS DURATION - MUSE: 82 MS
QT - MUSE: 292 MS
QTC - MUSE: 424 MS
R AXIS - MUSE: 15 DEGREES
SYSTOLIC BLOOD PRESSURE - MUSE: NORMAL MMHG
T AXIS - MUSE: -3 DEGREES
TSH SERPL DL<=0.005 MIU/L-ACNC: 0.51 MU/L (ref 0.4–4)
VENTRICULAR RATE- MUSE: 127 BPM

## 2021-08-15 PROCEDURE — 258N000003 HC RX IP 258 OP 636: Performed by: HOSPITALIST

## 2021-08-15 PROCEDURE — 120N000002 HC R&B MED SURG/OB UMMC

## 2021-08-15 PROCEDURE — 82550 ASSAY OF CK (CPK): CPT | Performed by: INTERNAL MEDICINE

## 2021-08-15 PROCEDURE — 93005 ELECTROCARDIOGRAM TRACING: CPT

## 2021-08-15 PROCEDURE — 99255 IP/OBS CONSLTJ NEW/EST HI 80: CPT | Performed by: NURSE PRACTITIONER

## 2021-08-15 PROCEDURE — 99234 HOSP IP/OBS SM DT SF/LOW 45: CPT | Performed by: HOSPITALIST

## 2021-08-15 PROCEDURE — 99207 PR APP CREDIT; MD BILLING SHARED VISIT: CPT | Performed by: INTERNAL MEDICINE

## 2021-08-15 PROCEDURE — 250N000013 HC RX MED GY IP 250 OP 250 PS 637: Performed by: HOSPITALIST

## 2021-08-15 PROCEDURE — 36415 COLL VENOUS BLD VENIPUNCTURE: CPT | Performed by: HOSPITALIST

## 2021-08-15 PROCEDURE — 99207 PR CDG-CODE CATEGORY CHANGED: CPT | Performed by: HOSPITALIST

## 2021-08-15 PROCEDURE — 80076 HEPATIC FUNCTION PANEL: CPT | Performed by: HOSPITALIST

## 2021-08-15 PROCEDURE — 93010 ELECTROCARDIOGRAM REPORT: CPT | Performed by: INTERNAL MEDICINE

## 2021-08-15 RX ORDER — DESVENLAFAXINE 50 MG/1
100 TABLET, FILM COATED, EXTENDED RELEASE ORAL DAILY
Qty: 60 TABLET | Refills: 0 | Status: SHIPPED | OUTPATIENT
Start: 2021-08-16 | End: 2021-08-15

## 2021-08-15 RX ORDER — NORTRIPTYLINE HYDROCHLORIDE 50 MG/1
50 CAPSULE ORAL AT BEDTIME
Status: DISCONTINUED | OUTPATIENT
Start: 2021-08-15 | End: 2021-08-16 | Stop reason: HOSPADM

## 2021-08-15 RX ORDER — CEPHALEXIN 500 MG/1
500 CAPSULE ORAL EVERY 12 HOURS
Status: DISCONTINUED | OUTPATIENT
Start: 2021-08-16 | End: 2021-08-15

## 2021-08-15 RX ORDER — NORETHINDRONE ACETATE AND ETHINYL ESTRADIOL 1MG-20(21)
1 KIT ORAL AT BEDTIME
Status: DISCONTINUED | OUTPATIENT
Start: 2021-08-15 | End: 2021-08-15

## 2021-08-15 RX ORDER — CEPHALEXIN 500 MG/1
500 CAPSULE ORAL EVERY 6 HOURS SCHEDULED
Status: DISCONTINUED | OUTPATIENT
Start: 2021-08-15 | End: 2021-08-15

## 2021-08-15 RX ORDER — CEPHALEXIN 500 MG/1
500 CAPSULE ORAL EVERY 12 HOURS
Qty: 10 CAPSULE | Refills: 0 | Status: SHIPPED | OUTPATIENT
Start: 2021-08-16 | End: 2021-08-15

## 2021-08-15 RX ORDER — NORTRIPTYLINE HYDROCHLORIDE 75 MG/1
150 CAPSULE ORAL AT BEDTIME
Status: ON HOLD | COMMUNITY
End: 2021-08-15

## 2021-08-15 RX ORDER — NORTRIPTYLINE HYDROCHLORIDE 50 MG/1
75 CAPSULE ORAL AT BEDTIME
COMMUNITY
Start: 2021-08-15

## 2021-08-15 RX ORDER — CEPHALEXIN 500 MG/1
500 CAPSULE ORAL EVERY 12 HOURS
Status: DISCONTINUED | OUTPATIENT
Start: 2021-08-15 | End: 2021-08-16 | Stop reason: HOSPADM

## 2021-08-15 RX ORDER — ACETAMINOPHEN AND CODEINE PHOSPHATE 120; 12 MG/5ML; MG/5ML
0.35 SOLUTION ORAL AT BEDTIME
Status: DISCONTINUED | OUTPATIENT
Start: 2021-08-15 | End: 2021-08-16 | Stop reason: HOSPADM

## 2021-08-15 RX ORDER — DESVENLAFAXINE 50 MG/1
100 TABLET, FILM COATED, EXTENDED RELEASE ORAL DAILY
Qty: 60 TABLET | Refills: 0 | Status: SHIPPED | OUTPATIENT
Start: 2021-08-16

## 2021-08-15 RX ORDER — HYDROXYZINE HYDROCHLORIDE 25 MG/1
25 TABLET, FILM COATED ORAL EVERY 8 HOURS PRN
Status: DISCONTINUED | OUTPATIENT
Start: 2021-08-15 | End: 2021-08-16 | Stop reason: HOSPADM

## 2021-08-15 RX ORDER — ACETAMINOPHEN AND CODEINE PHOSPHATE 120; 12 MG/5ML; MG/5ML
0.35 SOLUTION ORAL DAILY
COMMUNITY

## 2021-08-15 RX ORDER — DESVENLAFAXINE 50 MG/1
100 TABLET, FILM COATED, EXTENDED RELEASE ORAL DAILY
Status: DISCONTINUED | OUTPATIENT
Start: 2021-08-15 | End: 2021-08-16 | Stop reason: HOSPADM

## 2021-08-15 RX ORDER — LIDOCAINE 40 MG/G
CREAM TOPICAL
Status: DISCONTINUED | OUTPATIENT
Start: 2021-08-15 | End: 2021-08-16 | Stop reason: HOSPADM

## 2021-08-15 RX ORDER — CEPHALEXIN 500 MG/1
500 CAPSULE ORAL EVERY 12 HOURS
Qty: 10 CAPSULE | Refills: 0 | Status: SHIPPED | OUTPATIENT
Start: 2021-08-16 | End: 2021-09-02

## 2021-08-15 RX ADMIN — SODIUM CHLORIDE: 9 INJECTION, SOLUTION INTRAVENOUS at 01:47

## 2021-08-15 RX ADMIN — ACETAMINOPHEN AND CODEINE PHOSPHATE 0.35 MG: 120; 12 SOLUTION ORAL at 03:03

## 2021-08-15 RX ADMIN — CEPHALEXIN 500 MG: 500 CAPSULE ORAL at 16:28

## 2021-08-15 RX ADMIN — NORTRIPTYLINE HYDROCHLORIDE 50 MG: 50 CAPSULE ORAL at 02:21

## 2021-08-15 RX ADMIN — DESVENLAFAXINE SUCCINATE 100 MG: 50 TABLET, EXTENDED RELEASE ORAL at 09:49

## 2021-08-15 ASSESSMENT — ACTIVITIES OF DAILY LIVING (ADL)
WEAR_GLASSES_OR_BLIND: YES
WALKING_OR_CLIMBING_STAIRS_DIFFICULTY: NO
HEARING_DIFFICULTY_OR_DEAF: NO
DIFFICULTY_EATING/SWALLOWING: NO
DOING_ERRANDS_INDEPENDENTLY_DIFFICULTY: NO
TOILETING_ISSUES: NO
DIFFICULTY_COMMUNICATING: NO
DRESSING/BATHING_DIFFICULTY: NO
CONCENTRATING,_REMEMBERING_OR_MAKING_DECISIONS_DIFFICULTY: NO
VISION_MANAGEMENT: CONTACTS
PATIENT_/_FAMILY_COMMUNICATION_STYLE: SPOKEN LANGUAGE (ENGLISH OR BILINGUAL)
FALL_HISTORY_WITHIN_LAST_SIX_MONTHS: NO

## 2021-08-15 NOTE — CONSULTS
1738: I spoke with pharmacist Kay,  after my consult. Per their med rec appears there was an inadvertent and unintended doubling of dexfenflafaxine dose at last refill. Please see med rec/note by Kay pharmacist.  I called the floor and RN stated that this info was received and is in the process of being conveyed to the patient. Patient was unaware of this issue.  I requested that the RN notify Dr Davis as well.         [facility], [place]   Initial Psychiatric Consult   Consult date: August 15, 2021         Reason for Consult, requesting source:    Possible serotonin syndrome  Requesting source: Dov Zacarias Abbey        HPI:   Admitted   Per H&P: Admitted for work up possible serotonin syndrome  The patient was seen in psychiatric consultation.   She was admitted yesterday out of concern for serotonin syndrome- had clonus, dizziness, diaphoresis, and said she had been hallucinating and seeing and talking to people. Not command type.  She had changed from generic to brand name Pristiq and says she believes this was the cause of her symptoms. Also on NTP which was  here to 50 mg.  Has not used the nasal ketamine for some time.  Has tolerated combo as RX by Dr. Ewing for months until switch to brand name Pristiq form generic. Denies other drugs or drug combinations that would be of further concern for serotonin syndrome though her prescribed combination may cause sympathomimetic sx or CNS depression, ketamine can cause dissociation.   Uses cannabis, reliable source and labeled as to content from [place], no MDMA or other street drugs. No BZD other than given here. Alcohol a few drinks a week    Per Dr Davis: no florid serotonin syndrome today, nor in my present exam. CK normal. No confusion. Remains w elevated BP and tachy. Temp 99.2. no rigidity. No confusion. No tremor. Pupils are dilated. Has some nausea earlier but no emesis. Denies hallucination now.  She is  anxious.  May have sub threshold Sx but states she does not want to remain her to continue observation.  I see she was given the generic desvenlafaxine this am and NTP- she had been reluctant to stop these meds stating she is sensitive as well to sudden withdrawal Sx.      She was found to  Have a UTI. Now on Keflex.  ALT//127  BP (!) 151/116 (BP Location: Left arm)   Pulse 117   Temp 99.2  F (37.3  C) (Oral)   Resp 18   Wt 63.1 kg (139 lb 1.6 oz)   SpO2 98%   BMI 24.64 kg/m    EKG QTc 456.  No SI or HI. No evidence of gabbie psychosis. Has safety plan to go home with partner and [facility] It is my understanding that this would be AMA.  She is tearful initially but calms down as we talk. Her GF is at bedside and is attentive.  If she leave today she is in agreement to return to the hospital if Sx worsen or psychiatric symptoms develop. She will contact her psychiatrist tomorrow for further direction.    Please note: this is an abbreviated exam focusing on safety issues given patient's stated desire to leave.        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 Had TMs and is RX ketamine nasal, desvenlafaxine, and nortriptyline. She says [employer] testing has guided care.     No known HX MI commitment. No ECT.     I see from chart review she was admitted [facility] in 2017 age 17 started with a 72 hr hold for SI by helium or cutting.   Was on Luvox- at that time experienced AH, VH and tactile hallucinations.   Notes indicate some chronic SI starting age 12 or so. She was treated with Abilify and continued on Luvox on discharge from 2017 hospitalization.        Substance Use and History:   See discussion above.         Past Medical History:   PAST MEDICAL HISTORY:   Past Medical History:   Diagnosis Date     Alcohol use      Anxiety      Cluster B personality disorder      Depressive disorder         PAST SURGICAL HISTORY:   Past Surgical History:   Procedure Laterality Date     ENT SURGERY             Family History:   FAMILY HISTORY: No family history on file.  HX OF SUICIDE IN FAMILY:no known  HX OF MI/CD IN FAMILY:not reviewed        Social History:     The patient was born and raised in [place].  She moved to [place], completed her senior year in high school in [place] attends [employer] for college.  In relationship mahendra BORGES Has sisters she stays in touch with. No known trauma.          Physical ROS:   The patient endorsed desire to leave along with anxiety.  The remainder of 10-point review of systems was negative except as noted in HPI.         Medications:     Current Facility-Administered Medications:      cephALEXin (KEFLEX) capsule 500 mg, 500 mg, Oral, Q12H, Dov Potter MD, 500 mg at 08/15/21 1628     desvenlafaxine (PRISTIQ) 24 hr tablet 100 mg, 100 mg, Oral, Daily, Dov Potter MD, 100 mg at 08/15/21 0949     hydrOXYzine (ATARAX) tablet 25 mg, 25 mg, Oral, Q8H PRN, Dov Potter MD     lidocaine (LMX4) cream, , Topical, Q1H PRN, Dov Potter MD     lidocaine 1 % 0.1-1 mL, 0.1-1 mL, Other, Q1H PRN, Dov Potter MD     melatonin tablet 1 mg, 1 mg, Oral, At Bedtime PRN, Dov Potter MD     norethindrone (MICRONOR) 0.35 MG per tablet 0.35 mg, 0.35 mg, Oral, At Bedtime, Dov Potter MD, 0.35 mg at 08/15/21 0303     nortriptyline (PAMELOR) capsule 50 mg, 50 mg, Oral, At Bedtime, Dov Potter MD, 50 mg at 08/15/21 0221     sodium chloride (PF) 0.9% PF flush 3 mL, 3 mL, Intracatheter, Q8H, Dov Potter MD, 3 mL at 08/15/21 0950     sodium chloride (PF) 0.9% PF flush 3 mL, 3 mL, Intracatheter, q1 min prn, Dov Potter MD  Medications Prior to Admission   Medication Sig Dispense Refill Last Dose     desvenlafaxine (PRISTIQ) 50 MG 24 hr tablet 100 mg    8/13/2021 at Unknown time     ketamine  100 mg/mL (KETALAR) 100 MG/ML intranasal solution Apply into one nostril as directed At Bedtime   Past Week at Unknown time     norethindrone-ethinyl estradiol (BLISOVI FE 1/20) 1-20 MG-MCG tablet Take 1 tablet by mouth   Past Week at Unknown time     NORTRIPTYLINE HCL PO Take 75 mg by mouth At Bedtime    8/13/2021 at Unknown time     hydrOXYzine (ATARAX) 25 MG tablet Take 1-2 tablets (25-50 mg) by mouth every 4 hours as needed for anxiety (Patient not taking: Reported on 2/21/2021) 120 tablet 1      hydrOXYzine (VISTARIL) 25 MG capsule TAKE 1 CAPSULE BY MOUTH EVERY 8 HOURS AS NEEDED FOR ANXIETY.        sucralfate (CARAFATE) 1 GM/10ML suspension Take 10 mLs (1 g) by mouth 4 times daily as needed (Esophagitis pain) 420 mL 0      UNABLE TO FIND MEDICATION NAME: birth control             Allergies:     Allergies   Allergen Reactions     Augmentin Rash          Labs:     Recent Results (from the past 48 hour(s))   EKG 12 lead    Collection Time: 08/13/21  8:45 PM   Result Value Ref Range    Systolic Blood Pressure  mmHg    Diastolic Blood Pressure  mmHg    Ventricular Rate 132 BPM    Atrial Rate 132 BPM    WV Interval 138 ms    QRS Duration 84 ms     ms    QTc 426 ms    P Axis 29 degrees    R AXIS 45 degrees    T Axis -5 degrees    Interpretation ECG       Sinus tachycardia  Nonspecific T wave abnormality  Abnormal ECG  When compared with ECG of 11-AUG-2020 21:28,  Nonspecific T wave abnormality now evident in Lateral leads     EKG 12-lead, tracing only    Collection Time: 08/14/21  6:45 PM   Result Value Ref Range    Systolic Blood Pressure  mmHg    Diastolic Blood Pressure  mmHg    Ventricular Rate 127 BPM    Atrial Rate 127 BPM    WV Interval 148 ms    QRS Duration 82 ms     ms    QTc 424 ms    P Axis 28 degrees    R AXIS 15 degrees    T Axis -3 degrees    Interpretation ECG       Sinus tachycardia  Otherwise normal ECG  Unconfirmed report - interpretation of this ECG is computer generated - see medical  record for final interpretation  Confirmed by - EMERGENCY ROOM, PHYSICIAN ([id number]),  RICKI SIMS ([id number]) on 8/15/2021 2:15:35 PM     Extra Blue Top Tube    Collection Time: 08/14/21  7:45 PM   Result Value Ref Range    Hold Specimen JI    Extra Red Top Tube    Collection Time: 08/14/21  7:45 PM   Result Value Ref Range    Hold Specimen [facility]    Comprehensive metabolic panel    Collection Time: 08/14/21  7:48 PM   Result Value Ref Range    Sodium 133 133 - 144 mmol/L    Potassium 3.8 3.4 - 5.3 mmol/L    Chloride 102 94 - 109 mmol/L    Carbon Dioxide (CO2) 24 20 - 32 mmol/L    Anion Gap 7 3 - 14 mmol/L    Urea Nitrogen 11 7 - 30 mg/dL    Creatinine 0.67 0.52 - 1.04 mg/dL    Calcium 9.1 8.5 - 10.1 mg/dL    Glucose 81 70 - 99 mg/dL    Alkaline Phosphatase 86 40 - 150 U/L     (H) 0 - 45 U/L     (H) 0 - 50 U/L    Protein Total 7.8 6.8 - 8.8 g/dL    Albumin 4.0 3.4 - 5.0 g/dL    Bilirubin Total 0.4 0.2 - 1.3 mg/dL    GFR Estimate >90 >60 mL/min/1.73m2   CBC with platelets and differential    Collection Time: 08/14/21  7:48 PM   Result Value Ref Range    WBC Count 9.7 4.0 - 11.0 10e3/uL    RBC Count 4.97 3.80 - 5.20 10e6/uL    Hemoglobin 14.9 11.7 - 15.7 g/dL    Hematocrit 44.3 35.0 - 47.0 %    MCV 89 78 - 100 fL    MCH 30.0 26.5 - 33.0 pg    MCHC 33.6 31.5 - 36.5 g/dL    RDW 12.4 10.0 - 15.0 %    Platelet Count 256 150 - 450 10e3/uL    % Neutrophils 79 %    % Lymphocytes 13 %    % Monocytes 5 %    % Eosinophils 2 %    % Basophils 1 %    % Immature Granulocytes 0 %    NRBCs per 100 WBC 0 <1 /100    Absolute Neutrophils 7.7 1.6 - 8.3 10e3/uL    Absolute Lymphocytes 1.2 0.8 - 5.3 10e3/uL    Absolute Monocytes 0.4 0.0 - 1.3 10e3/uL    Absolute Eosinophils 0.2 0.0 - 0.7 10e3/uL    Absolute Basophils 0.1 0.0 - 0.2 10e3/uL    Absolute Immature Granulocytes 0.0 <=0.0 10e3/uL    Absolute NRBCs 0.0 10e3/uL   TSH with free T4 reflex    Collection Time: 08/14/21  7:48 PM   Result Value Ref Range    TSH 0.51 0.40 -  4.00 mU/L   UA with Microscopic reflex to Culture    Collection Time: 08/14/21  8:40 PM    Specimen: Urine, Midstream   Result Value Ref Range    Color Urine Light Yellow Colorless, Straw, Light Yellow, Yellow    Appearance Urine Clear Clear    Glucose Urine Negative Negative mg/dL    Bilirubin Urine Negative Negative    Ketones Urine 10  (A) Negative mg/dL    Specific Gravity Urine 1.025 1.003 - 1.035    Blood Urine Moderate (A) Negative    pH Urine 6.0 5.0 - 7.0    Protein Albumin Urine 20  (A) Negative mg/dL    Urobilinogen Urine Normal Normal, 2.0 mg/dL    Nitrite Urine Negative Negative    Leukocyte Esterase Urine Moderate (A) Negative    Bacteria Urine Few (A) None Seen /HPF    Mucus Urine Present (A) None Seen /LPF    RBC Urine 7 (H) <=2 /HPF    WBC Urine 32 (H) <=5 /HPF    Squamous Epithelials Urine 1 <=1 /HPF    Transitional Epithelials Urine 1 <=1 /HPF   Drug abuse screen 1 urine (ED)    Collection Time: 08/14/21  8:40 PM   Result Value Ref Range    Amphetamines Urine Screen Negative Screen Negative    Barbiturates Urine Screen Negative Screen Negative    Benzodiazepines Urine Screen Negative Screen Negative    Cannabinoids Urine Screen Positive (A) Screen Negative    Cocaine Urine Screen Negative Screen Negative    Opiates Urine Screen Negative Screen Negative   Urine Culture    Collection Time: 08/14/21  8:40 PM    Specimen: Urine, Midstream   Result Value Ref Range    Culture (A)      >100,000 CFU/mL Streptococcus agalactiae (Group B Streptococcus)   Asymptomatic COVID-19 Virus (Coronavirus) by PCR Nasopharyngeal    Collection Time: 08/14/21 11:21 PM    Specimen: Nasopharyngeal; Swab   Result Value Ref Range    SARS CoV2 PCR Negative Negative   Hepatic panel    Collection Time: 08/15/21  7:11 AM   Result Value Ref Range    Bilirubin Total 0.6 0.2 - 1.3 mg/dL    Bilirubin Direct 0.1 0.0 - 0.2 mg/dL    Protein Total 6.4 (L) 6.8 - 8.8 g/dL    Albumin 3.2 (L) 3.4 - 5.0 g/dL    Alkaline Phosphatase 74 40 -  150 U/L     (H) 0 - 45 U/L     (H) 0 - 50 U/L   CK total    Collection Time: 08/15/21  7:11 AM   Result Value Ref Range    CK 82 30 - 225 U/L   EKG 12-lead, tracing only    Collection Time: 08/15/21  8:06 AM   Result Value Ref Range    Systolic Blood Pressure  mmHg    Diastolic Blood Pressure  mmHg    Ventricular Rate 106 BPM    Atrial Rate 106 BPM    NH Interval 126 ms    QRS Duration 94 ms     ms    QTc 456 ms    P Axis 24 degrees    R AXIS 39 degrees    T Axis 18 degrees    Interpretation ECG       Sinus tachycardia  Cannot rule out Anterior infarct (cited on or before 15-AUG-2021)  Abnormal ECG  When compared with ECG of 15-AUG-2021 08:06, (unconfirmed)  No significant change was found            Physical and Psychiatric Examination:     BP (!) 151/116 (BP Location: Left arm)   Pulse 117   Temp 99.2  F (37.3  C) (Oral)   Resp 18   Wt 63.1 kg (139 lb 1.6 oz)   SpO2 98%   BMI 24.64 kg/m    Weight is 139 lbs 1.6 oz  Body mass index is 24.64 kg/m .    Physical Exam:  I have reviewed the physical exam as documented by the medical team and agree with findings and assessment and have no additional findings to add at this time.    Mental Status Exam  APPEARANCE/GROOMING/ATTITUDE: Adequate grooming, sitting on bed with her partner  ORIENTATION: Alert and oriented to person, place, date, day and situation   SPEECH: Normal rate and rhythm, clear  MOVEMENTS: No tics, tremors, rigidity or abnormal involuntary movements seen  THOUGHT PROCESS: Goal-directed, logical and linear  THOUGHT CONTENT: Denies suicidal or homicidal ideation.  Currently denies any auditory visual hallucinations.  No evidence of delusions or paranoia  ATTENTION/CONCENTRATION/RECALL: Adequate focus and concentration.  Recall not tested  MOOD: Anxious  AFFECT: Initially tearful but then columns as interview proceeds  INTELLECTUAL CAPACITY: Average  FUND OF KNOWLEDGE: Intact  INSIGHT: Understands that she has complex medical  issue  JUDGMENT: Desire to leave the hospital even if this is AGAINST MEDICAL ADVICE by medical provider  IMPULSE CONTROL: Intact.  States that she has safety plan and has support from her partner    RISK ASSESSMENT: There continues to be the lingering question of serotonin syndrome and recurrence of symptoms; history of chronic SI but states no plan or intent at this time         DSM-5 Diagnosis:   Major depressive disorder recurrent  Rule out serotonin syndrome-           Assessment:   This is a 21-year-old person who identifies as they/them who comes to the emergency room after researching possible serotonin syndrome.  Patient had hallucinations, weakness, dizziness, lightheadedness, positive clonus, sweating and tachycardia with elevated blood pressure and transaminitis on admission.  Overall symptoms do not suggest florid serotonin syndrome and she is reluctant to discontinue medication.  At this time I do not think that cyproheptadine is indicated and the patient wants to go home immediately.     Patient agrees to come back to the emergency room if she has any further symptoms or develops any suicidal ideation.  She will follow-up with her psychiatrist tomorrow.     This assessment is rather brief based on the patient's desire to leave the hospital though she does tolerate a basic assessment for safety and review of her current medical status.          Summary of Recommendations:   1.  From a psychiatric perspective the patient is safe to leave the hospital however it is my understanding that this will be AMA because of her unresolved medical status  2.  Safety planning patient agrees to return to the hospital if she has any further medical issues or if she develops suicidal ideation.  3.  The patient agrees to follow-up with her psychiatrist tomorrow morning    Please page me if you have any question [phone]

## 2021-08-15 NOTE — PROGRESS NOTES
[facility]    Medicine Progress Note - Hospitalist Service       Date of Admission:  8/14/2021    Assessment & Plan           See H and P by Dr Potter for details of A & P.     Patient overall doing better.  No florid serotonin syndrome in my exam.  CK normal.  Vitals stable.  Patient alert oriented x4.  Anxious, tearful.  Denies suicidal ideation.  Continue tolerating oral diet.  -Continue monitor for potential serotonin syndrome.  Telemetry  -Psychiatry consultation, pending.  Follow-up recs.     UTI: Urine culture with more than 100 K group B strep  Start on cephalexin 500 mg every 6h.  Allergic to penicillin.    Transaminitis: Improving.  Abdomen exam: Benign.    discontinue IVF.      Diet: Combination Diet Regular Diet Adult    DVT Prophylaxis: Low Risk/Ambulatory with no VTE prophylaxis indicated  [place] Catheter: Not present  Central Lines: None  Code Status: Full Code      Disposition Plan   Expected discharge: TBD    The patient's care was discussed with the Patient and RN.    Reyes Davis MD  Hospitalist Service  [facility]  Securely message with the Vocera Web Console (learn more here)  Text page via [employer] Paging/Directory        ______________________________________________________________________    Interval History   See above.     Data reviewed today: I reviewed all medications, new labs and imaging results over the last 24 hours. I personally reviewed no images or EKG's today.    Physical Exam   Vital Signs: Temp: 97.7  F (36.5  C) Temp src: Oral BP: (!) 153/103 Pulse: 117   Resp: 18 SpO2: 96 % O2 Device: None (Room air)    Weight: 139 lbs 1.6 oz    General Appearance: Awake, interactive, NAD  Respiratory: Normal work of breathing. CTA BL.   Cardiovascular: S1 S2 Regular  GI: Soft. NT. ND.  Extremities: Distally wwp. No pedal edema  Skin: No acute rash on exposed areas.   Other: A0 x 4. Grossly non focal.  No rigidity or tremors noted.     Data   Recent Labs   Lab 08/15/21  0711 08/14/21  1948   WBC  --  9.7   HGB  --  14.9   MCV  --  89   PLT  --  256   NA  --  133   POTASSIUM  --  3.8   CHLORIDE  --  102   CO2  --  24   BUN  --  11   CR  --  0.67   ANIONGAP  --  7   ARNAV  --  9.1   GLC  --  81   ALBUMIN 3.2* 4.0   PROTTOTAL 6.4* 7.8   BILITOTAL 0.6 0.4   ALKPHOS 74 86   * 246*   * 233*     No results found for this or any previous visit (from the past 24 hour(s)).  Medications       cephALEXin  500 mg Oral Q6H ZAFAR     desvenlafaxine  100 mg Oral Daily     norethindrone  0.35 mg Oral At Bedtime     nortriptyline  50 mg Oral At Bedtime     sodium chloride (PF)  3 mL Intracatheter Q8H

## 2021-08-15 NOTE — PLAN OF CARE
Pt arrived on floor ~0100    0130: Moonlighter paged regarding continuous fluids. Phone order of 50 mL/hr continuous fluids    VS: BP (!) 143/90 (BP Location: Left arm)   Pulse (!) 121   Temp 98.7  F (37.1  C) (Oral)   Resp 18   SpO2 98%    RA   Output: Voiding w/o difficulty  No BM during shift   Activity: SBA   Skin: intact   Pain: denied   Neuro/CMS: A&Ox4, anxious, CMS intact   Diet: regular   LDA: R PIV infusing NS 50 mL/hr   Equipment: IV pole   Plan: continue POC, possible psych consult   Additional Info: Pt has been tachycardic over night, started in the 120s, down to 110s.    Prefers they/them pronouns    Pt's home medication at charge desk. Norethindrone is in med room [facility] pharmacy lable

## 2021-08-15 NOTE — H&P
[facility]    History and Physical - Hospitalist Service       Date of Admission:  8/14/2021    Assessment & Plan      Phil Quezada is a 21 year old female with depression and CORNELIO admitted on 8/14/2021. She is admitted with concern for serotonin syndrome    1.  Possible serotonin syndrome: After a change in her psychiatric medications, patient has had a few days of worsening rigidity, hallucinations, feeling poorly.  Although this is consistent with possible serotonin syndrome.  So the differential for this could be broad, could be hyperthyroidism, hepatic encephalopathy, psychiatric in nature.  -Monitor QTc for worsening of any symptoms of potential serotonin syndrome  -We will add on a TSH  -Consult psychiatry for medication management    2.  History of depression, general anxiety disorder: Patient is seen by an outside psychiatrist .  Even prior to this incident, she was wondering if she needed changes in her medication regimen particularly lowering her nortriptyline dose.  Overall her mood is adequate at this time, she denies suicidal ideation, homicidal ideation though the hallucinations are stressful being in the hospital a stressful  -Psychiatry consult  -Patient stated that when she misses a dose of her nortriptyline and venlafaxine she develops very serious withdrawal.  Patient states that she can blackout and in her estimation would be in worse physical shape than she is currently.  Due to this reason I have elected to continue her venlafaxine (I have ordered the generic version) and nortriptyline (I have lowered the dose to 50 mg).  I did discuss with her that this may worsen her current symptoms and she is aware of this.  We will closely monitor her temperature and vital signs for any decompensation  -Patient inconsistently takes her outpatient ketamine.  I have not order this until clearly can be determined in her outpatient dose.    3.  Possible urinary tract  infection: Patient had a urinalysis that demonstrated white blood cells as well as a few bacteria.  She was treated with a full course of antibiotics for urinary tract infection approximately 10 days ago.  Patient is unaware of what antibiotic she was on.  Her new symptoms did not coincide with taking any of the antibiotics.  This time she denies any dysuria, urinary frequency or hesitation.  I do think that this urinalysis is more consistent with resolved UTI versus an inadequate specimen.  -Patient received a dose of Cipro in the emergency department.  I will discontinue this and await urine culture results for any additional antibiotic    4.  Mild transaminitis: Patient has ALT and AST in the 200s.  Unclear etiology.  The patient does consume alcohol but is not a heavy drinker.  She is has a normal set of labs from 10 months ago.  -We will repeat hepatic panel in the morning.  If remains high or increasing may consider further testing       Diet:   Regular Diet  DVT Prophylaxis: Low Risk/Ambulatory with no VTE prophylaxis indicated  [place] Catheter: Not present  Central Lines: None  Code Status:   FullCode    Disposition Plan   Expected discharge: 1-2 days recommended to prior living arrangement once symptoms have resolved, have a good plan for her psychiatric medications is established.     The patient's care was discussed with the Patient.    Dov Potter MD  [facility]  Securely message with the [employer] Web Console (learn more here)  Text page via [email or web address] Paging/Directory      ______________________________________________________________________    Chief Complaint   Hallucinations    History is obtained from the patient    History of Present Illness   Phil Quezada is a 21 year old female who presented to the [place] emergency department after several days of hallucinations, shaking sweats.    Patient states that approximately 2 weeks ago her  "nortriptyline and venlafaxine.  Changed from generic to brand names.  Few days after this change she began to notice feeling poorly.  She describes seeing and hearing things that are not there.  She describes being sweaty, rigid and having difficulty thinking and processing information.  Patient states that she feels like \"she is on drugs\".  She does not like how she feels.    In the emergency department, the patient was noted to be tachycardic and tachypneic.  Her laboratory evaluation was within normal limits other than elevated ALT and AST as well as a mildly positive urinalysis.  Still with hypothesized that this was related to serotonin syndrome from the changes in her medications and she was admitted for management of this problem    Review of Systems    The 10 point Review of Systems is negative other than noted in the HPI or here.     Past Medical History    I have reviewed this patient's medical history and updated it with pertinent information if needed.   Past Medical History:   Diagnosis Date     Alcohol use      Anxiety      Cluster B personality disorder      Depressive disorder        Past Surgical History   I have reviewed this patient's surgical history and updated it with pertinent information if needed.  Past Surgical History:   Procedure Laterality Date     ENT SURGERY         Social History   I have reviewed this patient's social history and updated it with pertinent information if needed.  Social History     Tobacco Use     Smoking status: Never Smoker     Smokeless tobacco: Never Used   Substance Use Topics     Alcohol use: Yes     Alcohol/week: 1.0 standard drinks     Types: 1 Shots of liquor per week     Comment: 4-6 drinks/month     Drug use: No       Family History   Family history was reviewed and no significant family history.    Prior to Admission Medications   Prior to Admission Medications   Prescriptions Last Dose Informant Patient Reported? Taking?   NORTRIPTYLINE HCL PO 8/13/2021 " at Unknown time  Yes Yes   Sig: Take 75 mg by mouth At Bedtime    UNABLE TO FIND   Yes No   Sig: MEDICATION NAME: birth control   desvenlafaxine (PRISTIQ) 50 MG 24 hr tablet 2021 at Unknown time  Yes Yes   Si mg    hydrOXYzine (ATARAX) 25 MG tablet  Self No No   Sig: Take 1-2 tablets (25-50 mg) by mouth every 4 hours as needed for anxiety   Patient not taking: Reported on 2021   hydrOXYzine (VISTARIL) 25 MG capsule   Yes No   Sig: TAKE 1 CAPSULE BY MOUTH EVERY 8 HOURS AS NEEDED FOR ANXIETY.   ketamine 100 mg/mL (KETALAR) 100 MG/ML intranasal solution Past Week at Unknown time  Yes Yes   Sig: Apply into one nostril as directed At Bedtime   norethindrone-ethinyl estradiol (BLISOVI FE ) 1-20 MG-MCG tablet Past Week at Unknown time  Yes Yes   Sig: Take 1 tablet by mouth   sucralfate (CARAFATE) 1 GM/10ML suspension   No No   Sig: Take 10 mLs (1 g) by mouth 4 times daily as needed (Esophagitis pain)      Facility-Administered Medications: None     Allergies   Allergies   Allergen Reactions     Augmentin Rash       Physical Exam   Vital Signs: Temp: 98.5  F (36.9  C) Temp src: Oral BP: (!) 146/105 Pulse: (!) 128   Resp: 18 SpO2: 99 % O2 Device: None (Room air)    Weight: 0 lbs 0 oz    Constitutional: awake, alert, cooperative, no apparent distress, and appears stated age  Eyes:  pupils equal, round and reactive to light, extra ocular muscles intact, sclera clear, conjunctiva normal  Hematologic / Lymphatic: no cervical lymphadenopathy  Respiratory: No increased work of breathing, good air exchange, clear to auscultation bilaterally, no crackles or wheezing  Cardiovascular: tachycardic, normal S1 and S2, no S3 or S4, and no murmur noted  GI: No scars, normal bowel sounds, soft, non-distended, non-tender, no masses palpated, no hepatosplenomegally  Skin: scattered bruises on her right upper thigh  Musculoskeletal: There is no redness, warmth, or swelling of the joints.  Full range of motion noted.  Motor  strength is 5 out of 5 all extremities bilaterally.  Tone is normal.  Neurologic: Awake, alert, oriented to name, place and time.  No clonus. Patellar, Biceps and brachioradialis reflexes all brisk. Cerebellar testing difficult to interpret due to tremor. Bilateral tremor noted.  Hypertonic in the legs.  Neuropsychiatric: General: normal, calm and normal eye contact  Affect: normal, pleasant and activated    Data   Data reviewed today: I reviewed all medications, new labs and imaging results over the last 24 hours. I personally reviewed

## 2021-08-15 NOTE — PROGRESS NOTES
The patient was seen in psychiatric consultation. Per Dr Davis: no florid serotonin syndrome today, nor in my exam. CK normal. No confusion. Remains w elevated BP and tachy.     She was admitted yesterday out of concern for serotonin syndrome- had clonus, dizziness, diaphoresis, and said she had been hallucinating and seeing and talking to people. Not command type.  She had changed from generic to brand name Pristiq and says she believes this was the cause of her symptoms. Also on NTP which was  here to 50 mg.  Has not used the nasal ketamine for some time.  Has tolerated combo as RX by Dr. Ewing for months until switch to brand name Pristiq form generic. Denies other drugs or drug combinations that would be of further concern for serotonin syndrome though her prescribed combination may cause sympathomimetic sx or CNS depression, ketamine can cause dissociation.   Uses cannabis, reliable source and labeled as to content from [place], no MDMA or other street drugs. No BZD other than given here. Alcohol a few drinks a week    She was found to  Have a UTI. Now on Keflex.  ALT//127  BP (!) 151/116 (BP Location: Left arm)   Pulse 117   Temp 99.2  F (37.3  C) (Oral)   Resp 18   Wt 63.1 kg (139 lb 1.6 oz)   SpO2 98%   BMI 24.64 kg/m      No SI or HI. No evidence of gabbie psychosis. Has safety plan to go home with partner and would like to go tonight. It is my understanding that this would be AMA.  She is tearful initially but calms down as we talk. Her GF is at bedside and is attentive.  If she leave today she is in agreement to return to the hospital if Sx worsen or psychiatric symptoms develop. She will contact her psychiatrist tomorrow for further direction.    Full note to follow.  Please call if you have any question [phone]

## 2021-08-15 NOTE — ED NOTES
Pt given PO meds for an infection and to help her relax.  Pt has become increasingly anxious while waiting in the ED.  Pt made aware of her being admitted and COVID obtained and sent. Pt on the cardiac monitor and is a ST.

## 2021-08-15 NOTE — PLAN OF CARE
Patient A&O x4 and able to make her needs known. Denied CP, lightheadedness, dizziness, numbness or tingling. Denied SOB/TOVAR and pain. Pt is drinking well and voiding spontaneously without difficulties. Can turn and reposition self in bed, heels elevated off bed, demonstrates the ability to use call light appropriately. Pt wanted to discharge home today no matter what. Plan was to start Abx and be seen by psych. Psych paged and was able to see the pt and decided can discharge but awaiting for medical to discharge and send with ABx. Pt's preferred pharmacy is [facility]: [address] [place] [address] [place] [address].  Will continue with POC.

## 2021-08-15 NOTE — PHARMACY-ADMISSION MEDICATION HISTORY
"Admission Medication History Completed by Pharmacy    See [facility] Admission Navigator for allergy information, preferred outpatient pharmacy, prior to admission medications and immunization status.     Medication History Sources:     [facility] ([phone]    Patient interview    Changes made to PTA medication list (reason):    Added: Norethindrone    Deleted: Ketamine intranasal, sucralfate PRN    Changed: Desvenlafaxine ER 50 mg daily > 100 mg daily; nortripyline 75 mg HS > 150 mg HS    Additional Information:    The pharmacist at [facility] where patient fills her prescriptions notified writer that most recently picked-up desvenlafaxine Rx had been written for brand name PRISTIQ (ROSSI=1) 100 mg daily whereas patient had been filling generic desvenlafaxine ER 50 mg daily prior to this prescription, corroborated by fill history. The pharmacist also then explained that the psychiatrist recently sent in a new prescription for the generic 50 mg capsules \"Take 1 capsule by mouth daily\", describing this most recent fill for the brand name 100 mg as an \"error\". Spoke w/ patient AFTER speaking w/ patient's pharmacy and NP Ira Pittman. The patient reports she has been filling her old prescription for the generic 50 mg capsules and TAKING 2 CAPSULES DAILY to total 100 mg. She was frequently attempting to fill the prescription early to ensure she had enough supply built up to continue to take 2 capsules daily because this was cheaper than filling for the brand name. Please DISREGARD inadvertent dose increase alert report given to Ira Pittman by this writer given more information now available suggesting patient has NOT experienced a dose increase.    Prior to Admission medications    Medication Sig Last Dose Taking? Auth Provider   cephALEXin (KEFLEX) 500 MG capsule Take 1 capsule (500 mg) by mouth every 12 hours for 5 days  Yes Reyes Davis MD   desvenlafaxine (PRISTIQ) 50 MG 24 hr tablet Take 100 mg by mouth " daily  8/13/2021 Yes Reported, Patient   hydrOXYzine (VISTARIL) 25 MG capsule TAKE 1 CAPSULE BY MOUTH EVERY 8 HOURS AS NEEDED FOR ANXIETY.  Yes Reported, Patient   norethindrone (MICRONOR) 0.35 MG tablet Take 0.35 mg by mouth daily 8/13/2021 Yes Unknown, Entered By History   nortriptyline (PAMELOR) 75 MG capsule Take 150 mg by mouth At Bedtime 8/13/2021 Yes Unknown, Entered By History       Date completed: 08/15/21    Medication history completed by:   Kay Kitchen, PharmD, [facility]  Clinical Pharmacist

## 2021-08-16 ENCOUNTER — PATIENT OUTREACH (OUTPATIENT)
Dept: CARE COORDINATION | Facility: CLINIC | Age: 21
End: 2021-08-16

## 2021-08-16 DIAGNOSIS — Z71.89 OTHER SPECIFIED COUNSELING: ICD-10-CM

## 2021-08-16 LAB
ATRIAL RATE - MUSE: 132 BPM
DIASTOLIC BLOOD PRESSURE - MUSE: NORMAL MMHG
INTERPRETATION ECG - MUSE: NORMAL
P AXIS - MUSE: 29 DEGREES
PR INTERVAL - MUSE: 138 MS
QRS DURATION - MUSE: 84 MS
QT - MUSE: 288 MS
QTC - MUSE: 426 MS
R AXIS - MUSE: 45 DEGREES
SYSTOLIC BLOOD PRESSURE - MUSE: NORMAL MMHG
T AXIS - MUSE: -5 DEGREES
VENTRICULAR RATE- MUSE: 132 BPM

## 2021-08-16 NOTE — PROGRESS NOTES
Clinic Care Coordination Contact  [facility]/Voicemail       Clinical Data: Care Coordinator Outreach    Outreach attempted x 1.  Unable to leave message on patient's voicemail with call back information and requested return call. No  set up    Plan: . Care Coordinator will try to reach patient again in 1-2 business days.    Kaya Killian  [facility]  [facility]

## 2021-08-16 NOTE — DISCHARGE INSTRUCTIONS
CMPRecent Labs   Lab 08/15/21  0711 08/14/21 1948   NA  --  133   POTASSIUM  --  3.8   CHLORIDE  --  102   CO2  --  24   ANIONGAP  --  7   GLC  --  81   BUN  --  11   CR  --  0.67   GFRESTIMATED  --  >90   ARNAV  --  9.1   PROTTOTAL 6.4* 7.8   ALBUMIN 3.2* 4.0   BILITOTAL 0.6 0.4   ALKPHOS 74 86   * 233*   * 246*     CBC  Recent Labs   Lab 08/14/21 1948   WBC 9.7   RBC 4.97   HGB 14.9   HCT 44.3   MCV 89   MCH 30.0   MCHC 33.6   RDW 12.4

## 2021-08-16 NOTE — PLAN OF CARE
Pt. discharged at 2100 8/15to home, was accompanied by girlfriend, and left with personal belongings/ home medications. Pt. received complete discharge paperwork and medications were sent to [facility] pharmacy in [place] [facility]; antibiotics and generic Pristiq. Pt. was given times of last dose for all discharge medications in writing on discharge medication sheets. Discharge teaching included antibiotic medication adherence, UTI education, activity restrictions, dressing changes, and signs and symptoms of infection or serotonin syndrome worsening. Pt informed about elevated liver enzymes and BP during her stay. Pt. to follow up with primary psych in 3 days. Pt. had no further questions at the time of discharge and no unmet needs were identified.

## 2021-08-16 NOTE — PROGRESS NOTES
2                   Current as of: 8/16/2021 10:59 AM

## 2021-08-16 NOTE — DISCHARGE SUMMARY
[facility]  Hospitalist Discharge Summary      Date of Admission:  8/14/2021  Date of Discharge:  8/15/2021  Discharging Provider: Reyes Davis MD      Discharge Diagnoses     ?  Rule out serotonin syndrome  Depression, anxiety disorder  UTI  Mild transaminitis    Follow-ups Needed After Discharge   Follow-up Appointments     Adult [facility] Follow-up and recommended labs and tests      Follow up with primary care provider, [facility], and/or   Psychiatry  within 2-3 days for hospital follow- up and to follow up on   results.  The following labs/tests are recommended: CMP. ? Recurrent UTI.     ? Medication reaction ? Serotonin syndrome.     Appointments on [place] and/or [facility] (with [facility] or [facility]   provider or service). Call [phone] if you haven't heard regarding   these appointments within 7 days of discharge.               Discharge Disposition   Discharged to home  Condition at discharge: Stable      Hospital Course     Phil Quezada is a 21 year old female with depression and CORNELIO admitted on 8/14/2021. She is admitted with concern for serotonin syndrome  History of depression, general anxiety disorder: Patient is seen by an outside psychiatrist .   Patient monitor closely with telemetry monitor, frequent vitals, neurochecks.  Remained stable.   Patient overall doing better.  Hemodynamics stable.  Currently no florid serotonin syndrome in my exam.  CK normal.  Patient alert oriented x4.  Anxious, tearful at times. Denies suicidal ideation.  Psychiatry consultation done.  Pharmacy consultation done.  There was some confusion about PTA medications.  Clarified by pharmacy.  See pharmacy note.  Patient takes Pristiq generic form 50 mg, 2 tablets daily.  Also confirmed nortriptyline dose was decreased to 50 mg bedtime.  Per psychiatry: Patient is safe to leave hospital from psychiatric standpoint.  Patient offered to stay another  day, however insisted on going home and following up with her outpatient provider.  Currently no compelling indications to hold the patient in the hospital.  Patient appears oriented x4 and decisional.   Patient agrees to come back to the emergency room if she has any further symptoms or develops any suicidal ideation.  She will follow-up with her psychiatrist tomorrow.       UTI: Treat with cephalexin 500 mg twice daily for 5 days    Mild transaminitis: Patient has ALT and AST in the 200s.  Unclear etiology.  The patient does consume alcohol but is not a heavy drinker.  She is has a normal set of labs from 10 months ago.  Improving.  Symptomatic.    Follow-up with PCP.    Consultations This Hospital Stay   PSYCHIATRY IP CONSULT  CARE MANAGEMENT / SOCIAL WORK IP CONSULT    Code Status   Full Code    Time Spent on this Encounter   I, Reyes Davis MD, personally saw the patient today and spent greater than 30 minutes discharging this patient.       Reyes Davis MD  [facility] [address]  [address] [address]  Phone: [phone]  Fax: [phone]  ______________________________________________________________________    Physical Exam   Vital Signs: Temp: 99.2  F (37.3  C) Temp src: Oral BP: (!) 151/116 Pulse: 117   Resp: 18 SpO2: 98 % O2 Device: None (Room air)    Weight: 139 lbs 1.6 oz  General Appearance:  Awake, interactive, NAD  Respiratory: Normal work of breathing. CTA BL.   Cardiovascular: S1 S2 Regular  GI: Soft. NT. ND.  Extremities: Distally wwp. No pedal edema  Skin: No acute rash on exposed areas.   Other: A0 x 4. Grossly non focal. No rigidity or tremors noted.        Primary Care Physician   [facility]    Discharge Orders      Reason for your hospital stay    Admitted for possible drug reaction ? Serotonin syndrome. Monitored closely using telemetry in the medical floor. You are currently doing well. Currently no obvious serotonin signs and symptoms present. You were  seen by Psychiatry.   Your liver enzymes are elevated, cause unclear. However its improving. Please follow up at your primary care office.   You also have Urinary tract infection: urine culture growing Gr B Streptococcus. Started on Cephalexin 500 mg bid x 5 days.     Please return to the hospital if your symptoms worsens or new psychiatric symptoms develop. Follow-up with  your psychiatrist tomorrow for further direction.     Activity    Your activity upon discharge: activity as tolerated     Adult [facility] Follow-up and recommended labs and tests    Follow up with primary care provider, [facility], and/or Psychiatry  within 2-3 days for hospital follow- up and to follow up on results.  The following labs/tests are recommended: CMP. ? Recurrent UTI.   ? Medication reaction ? Serotonin syndrome.     Appointments on [place] and/or [facility] (with [facility] or [facility] provider or service). Call [phone] if you haven't heard regarding these appointments within 7 days of discharge.     Diet    Follow this diet upon discharge: Orders Placed This Encounter      Combination Diet Regular Diet Adult       Significant Results and Procedures   Most Recent 3 CBC's:Recent Labs   Lab Test 08/14/21 1948 06/24/20 1914 04/30/18  1345   WBC 9.7 15.9* 8.3   HGB 14.9 13.9 14.1   MCV 89 93 87    278 357     Most Recent 3 BMP's:Recent Labs   Lab Test 08/14/21 1948 06/24/20 1914 04/30/18  1345    130* 140   POTASSIUM 3.8 3.5 3.9   CHLORIDE 102 99 106   CO2 24 22 26   BUN 11 9 15   CR 0.67 0.89 0.88   ANIONGAP 7 9 7   ARNAV 9.1 9.2 8.5   GLC 81 163* 84     Most Recent 2 LFT's:Recent Labs   Lab Test 08/15/21  0711 08/14/21 1948   * 233*   * 246*   ALKPHOS 74 86   BILITOTAL 0.6 0.4     Most Recent 3 INR's:No lab results found.  Most Recent 6 Bacteria Isolates From Any Culture (See EPIC Reports for Culture Details):Recent Labs   Lab Test 06/24/20 2059 06/24/20 2029   CULT No growth  No growth  50,000 to 100,000 colonies/mL  Escherichia coli  *     Most Recent Urinalysis:Recent Labs   Lab Test 08/14/21 2040   COLOR Light Yellow   APPEARANCE Clear   URINEGLC Negative   URINEBILI Negative   URINEKETONE 10 *   SG 1.025   UBLD Moderate*   URINEPH 6.0   PROTEIN 20 *   NITRITE Negative   LEUKEST Moderate*   RBCU 7*   WBCU 32*     Most Recent ESR & CRP:No lab results found.  Most Recent CPK:Recent Labs   Lab Test 08/15/21  0711   CKT 82   ,   Results for orders placed or performed during the hospital encounter of 06/24/20   CT Abdomen Pelvis w Contrast    Narrative    EXAM: CT ABDOMEN PELVIS W CONTRAST  LOCATION: [facility]  DATE/TIME: 6/24/2020 8:01 PM    INDICATION: Right lower quadrant pain.  COMPARISON: None.  TECHNIQUE: CT scan of the abdomen and pelvis was performed following injection of IV contrast. Multiplanar reformats were obtained. Dose reduction techniques were used.  CONTRAST: 57 mL Isovue-370.    FINDINGS:   LOWER CHEST: Normal.    HEPATOBILIARY: Normal.    PANCREAS: Normal.    SPLEEN: Normal.    ADRENAL GLANDS: Normal.    KIDNEYS/BLADDER: Multifocal regions of decreased patchy enhancement in the kidneys compatible with pyelonephritis with urothelial thickening and enhancement in both ureters and renal pelves more pronounced on the right.    BOWEL: Normal appendix.    LYMPH NODES: Normal.    VASCULATURE: Unremarkable.    PELVIC ORGANS: Mild urinary bladder wall thickening.    MUSCULOSKELETAL: Normal.      Impression    IMPRESSION:   1.  Multifocal regions of decreased enhancement in both kidneys compatible with bilateral multifocal pyelonephritis. Urothelial thickening and enhancement in the renal pelves and ureters more pronounced on the right compatible with ascending infection.   Mild urinary bladder wall thickening compatible with cystitis.    2.  No appendicitis.       Discharge Medications   Current Discharge Medication List      START taking these medications    Details    cephALEXin (KEFLEX) 500 MG capsule Take 1 capsule (500 mg) by mouth every 12 hours  Qty: 10 capsule, Refills: 0    Associated Diagnoses: Acute cystitis without hematuria         CONTINUE these medications which have CHANGED    Details   desvenlafaxine (PRISTIQ) 50 MG 24 hr tablet Take 2 tablets (100 mg) by mouth daily Generic desvenlafaxine please DO NOT CRUSH.  Qty: 60 tablet, Refills: 0    Associated Diagnoses: Major depressive disorder, recurrent episode, in full remission (H)      nortriptyline (PAMELOR) 50 MG capsule Take 1 capsule (50 mg) by mouth At Bedtime         CONTINUE these medications which have NOT CHANGED    Details   hydrOXYzine (VISTARIL) 25 MG capsule TAKE 1 CAPSULE BY MOUTH EVERY 8 HOURS AS NEEDED FOR ANXIETY.      norethindrone (MICRONOR) 0.35 MG tablet Take 0.35 mg by mouth daily           Allergies   Allergies   Allergen Reactions     Augmentin Rash

## 2021-08-17 LAB
ATRIAL RATE - MUSE: 106 BPM
DIASTOLIC BLOOD PRESSURE - MUSE: NORMAL MMHG
INTERPRETATION ECG - MUSE: NORMAL
P AXIS - MUSE: 24 DEGREES
PR INTERVAL - MUSE: 126 MS
QRS DURATION - MUSE: 94 MS
QT - MUSE: 344 MS
QTC - MUSE: 456 MS
R AXIS - MUSE: 39 DEGREES
SYSTOLIC BLOOD PRESSURE - MUSE: NORMAL MMHG
T AXIS - MUSE: 18 DEGREES
VENTRICULAR RATE- MUSE: 106 BPM

## 2021-08-17 NOTE — PROGRESS NOTES
Clinic Care Coordination Contact  [facility]/Voicemail       Clinical Data: Care Coordinator Outreach    Outreach attempted x 2.  Left message on patient's voicemail with call back information and requested return call.    Plan:  Care Coordinator will do no further outreaches at this time.    Kaya Killian  [facility] Care Resource [facility]

## 2021-09-02 ENCOUNTER — HOSPITAL ENCOUNTER (EMERGENCY)
Facility: CLINIC | Age: 21
Discharge: HOME | End: 2021-09-02
Attending: EMERGENCY MEDICINE | Admitting: INTERNAL MEDICINE
Payer: COMMERCIAL

## 2021-09-02 VITALS
DIASTOLIC BLOOD PRESSURE: 113 MMHG | HEART RATE: 111 BPM | RESPIRATION RATE: 8 BRPM | TEMPERATURE: 98.9 F | OXYGEN SATURATION: 98 % | SYSTOLIC BLOOD PRESSURE: 155 MMHG

## 2021-09-02 DIAGNOSIS — E87.20 LACTIC ACIDOSIS: ICD-10-CM

## 2021-09-02 DIAGNOSIS — F29 PSYCHOSIS, UNSPECIFIED PSYCHOSIS TYPE (H): ICD-10-CM

## 2021-09-02 DIAGNOSIS — F10.920 ALCOHOLIC INTOXICATION WITHOUT COMPLICATION (H): ICD-10-CM

## 2021-09-02 PROBLEM — I15.9 SECONDARY HYPERTENSION: Status: ACTIVE | Noted: 2021-09-02

## 2021-09-02 PROBLEM — R00.0 SINUS TACHYCARDIA: Status: ACTIVE | Noted: 2021-09-02

## 2021-09-02 PROBLEM — R44.1 HALLUCINATIONS, VISUAL: Status: ACTIVE | Noted: 2021-09-02

## 2021-09-02 PROBLEM — F10.921 ALCOHOL INTOXICATION WITH DELIRIUM (H): Status: ACTIVE | Noted: 2021-09-02

## 2021-09-02 PROBLEM — F60.9 PERSONALITY DISORDER (H): Status: ACTIVE | Noted: 2021-09-02

## 2021-09-02 LAB
ALBUMIN SERPL-MCNC: 4.2 G/DL (ref 3.4–5)
ALBUMIN SERPL-MCNC: 4.5 G/DL (ref 3.4–5)
ALBUMIN UR-MCNC: NEGATIVE MG/DL
ALP SERPL-CCNC: 76 U/L (ref 40–150)
ALP SERPL-CCNC: 78 U/L (ref 40–150)
ALT SERPL W P-5'-P-CCNC: 293 U/L (ref 0–50)
ALT SERPL W P-5'-P-CCNC: 342 U/L (ref 0–50)
AMMONIA PLAS-SCNC: <10 UMOL/L (ref 10–50)
AMPHETAMINES UR QL SCN: NORMAL
ANION GAP SERPL CALCULATED.3IONS-SCNC: 14 MMOL/L (ref 3–14)
ANION GAP SERPL CALCULATED.3IONS-SCNC: 5 MMOL/L (ref 3–14)
APAP SERPL-MCNC: <2 MG/L (ref 10–30)
APPEARANCE UR: CLEAR
AST SERPL W P-5'-P-CCNC: 228 U/L (ref 0–45)
AST SERPL W P-5'-P-CCNC: 272 U/L (ref 0–45)
ATRIAL RATE - MUSE: 122 BPM
BARBITURATES UR QL: NORMAL
BASOPHILS # BLD AUTO: 0.1 10E3/UL (ref 0–0.2)
BASOPHILS NFR BLD AUTO: 2 %
BENZODIAZ UR QL: NORMAL
BILIRUB SERPL-MCNC: 0.3 MG/DL (ref 0.2–1.3)
BILIRUB SERPL-MCNC: 0.8 MG/DL (ref 0.2–1.3)
BILIRUB UR QL STRIP: NEGATIVE
BUN SERPL-MCNC: 6 MG/DL (ref 7–30)
BUN SERPL-MCNC: 7 MG/DL (ref 7–30)
CALCIUM SERPL-MCNC: 8.4 MG/DL (ref 8.5–10.1)
CALCIUM SERPL-MCNC: 8.5 MG/DL (ref 8.5–10.1)
CANNABINOIDS UR QL SCN: NORMAL
CHLORIDE BLD-SCNC: 102 MMOL/L (ref 94–109)
CHLORIDE BLD-SCNC: 109 MMOL/L (ref 94–109)
CK SERPL-CCNC: 118 U/L (ref 30–225)
CO2 SERPL-SCNC: 20 MMOL/L (ref 20–32)
CO2 SERPL-SCNC: 27 MMOL/L (ref 20–32)
COCAINE UR QL: NORMAL
COLOR UR AUTO: NORMAL
CREAT SERPL-MCNC: 0.6 MG/DL (ref 0.52–1.04)
CREAT SERPL-MCNC: 0.81 MG/DL (ref 0.52–1.04)
DIASTOLIC BLOOD PRESSURE - MUSE: NORMAL MMHG
EOSINOPHIL # BLD AUTO: 0.1 10E3/UL (ref 0–0.7)
EOSINOPHIL NFR BLD AUTO: 3 %
ERYTHROCYTE [DISTWIDTH] IN BLOOD BY AUTOMATED COUNT: 13.2 % (ref 10–15)
ETHANOL SERPL-MCNC: 0.42 G/DL
GFR SERPL CREATININE-BSD FRML MDRD: >90 ML/MIN/1.73M2
GFR SERPL CREATININE-BSD FRML MDRD: >90 ML/MIN/1.73M2
GLUCOSE BLD-MCNC: 57 MG/DL (ref 70–99)
GLUCOSE BLD-MCNC: 85 MG/DL (ref 70–99)
GLUCOSE UR STRIP-MCNC: NEGATIVE MG/DL
HCT VFR BLD AUTO: 45.1 % (ref 35–47)
HGB BLD-MCNC: 14.8 G/DL (ref 11.7–15.7)
HGB UR QL STRIP: NEGATIVE
IMM GRANULOCYTES # BLD: 0 10E3/UL
IMM GRANULOCYTES NFR BLD: 0 %
INTERPRETATION ECG - MUSE: NORMAL
KETONES UR STRIP-MCNC: NEGATIVE MG/DL
LACTATE SERPL-SCNC: 2.8 MMOL/L (ref 0.7–2)
LACTATE SERPL-SCNC: 3.1 MMOL/L (ref 0.7–2)
LEUKOCYTE ESTERASE UR QL STRIP: NEGATIVE
LYMPHOCYTES # BLD AUTO: 1.3 10E3/UL (ref 0.8–5.3)
LYMPHOCYTES NFR BLD AUTO: 39 %
MCH RBC QN AUTO: 29.7 PG (ref 26.5–33)
MCHC RBC AUTO-ENTMCNC: 32.8 G/DL (ref 31.5–36.5)
MCV RBC AUTO: 91 FL (ref 78–100)
MONOCYTES # BLD AUTO: 0.2 10E3/UL (ref 0–1.3)
MONOCYTES NFR BLD AUTO: 7 %
NEUTROPHILS # BLD AUTO: 1.7 10E3/UL (ref 1.6–8.3)
NEUTROPHILS NFR BLD AUTO: 49 %
NITRATE UR QL: NEGATIVE
NRBC # BLD AUTO: 0 10E3/UL
NRBC BLD AUTO-RTO: 0 /100
OPIATES UR QL SCN: NORMAL
P AXIS - MUSE: 15 DEGREES
PH UR STRIP: 6 [PH] (ref 5–7)
PLATELET # BLD AUTO: 281 10E3/UL (ref 150–450)
POTASSIUM BLD-SCNC: 3.5 MMOL/L (ref 3.4–5.3)
POTASSIUM BLD-SCNC: 3.8 MMOL/L (ref 3.4–5.3)
PR INTERVAL - MUSE: 138 MS
PROCALCITONIN SERPL-MCNC: <0.05 NG/ML
PROT SERPL-MCNC: 7.7 G/DL (ref 6.8–8.8)
PROT SERPL-MCNC: 8.3 G/DL (ref 6.8–8.8)
QRS DURATION - MUSE: 88 MS
QT - MUSE: 326 MS
QTC - MUSE: 464 MS
R AXIS - MUSE: 2 DEGREES
RBC # BLD AUTO: 4.98 10E6/UL (ref 3.8–5.2)
SALICYLATES SERPL-MCNC: 2 MG/DL
SARS-COV-2 RNA RESP QL NAA+PROBE: NEGATIVE
SODIUM SERPL-SCNC: 136 MMOL/L (ref 133–144)
SODIUM SERPL-SCNC: 141 MMOL/L (ref 133–144)
SP GR UR STRIP: 1.01 (ref 1–1.03)
SYSTOLIC BLOOD PRESSURE - MUSE: NORMAL MMHG
T AXIS - MUSE: -5 DEGREES
UROBILINOGEN UR STRIP-MCNC: NORMAL MG/DL
VENTRICULAR RATE- MUSE: 122 BPM
WBC # BLD AUTO: 3.5 10E3/UL (ref 4–11)

## 2021-09-02 PROCEDURE — 96374 THER/PROPH/DIAG INJ IV PUSH: CPT

## 2021-09-02 PROCEDURE — 36415 COLL VENOUS BLD VENIPUNCTURE: CPT | Performed by: EMERGENCY MEDICINE

## 2021-09-02 PROCEDURE — 93005 ELECTROCARDIOGRAM TRACING: CPT

## 2021-09-02 PROCEDURE — 81003 URINALYSIS AUTO W/O SCOPE: CPT | Performed by: EMERGENCY MEDICINE

## 2021-09-02 PROCEDURE — 82140 ASSAY OF AMMONIA: CPT | Performed by: EMERGENCY MEDICINE

## 2021-09-02 PROCEDURE — 99236 HOSP IP/OBS SAME DATE HI 85: CPT | Performed by: INTERNAL MEDICINE

## 2021-09-02 PROCEDURE — 250N000011 HC RX IP 250 OP 636: Performed by: EMERGENCY MEDICINE

## 2021-09-02 PROCEDURE — 87635 SARS-COV-2 COVID-19 AMP PRB: CPT | Performed by: EMERGENCY MEDICINE

## 2021-09-02 PROCEDURE — 80143 DRUG ASSAY ACETAMINOPHEN: CPT | Performed by: EMERGENCY MEDICINE

## 2021-09-02 PROCEDURE — 83605 ASSAY OF LACTIC ACID: CPT | Performed by: EMERGENCY MEDICINE

## 2021-09-02 PROCEDURE — 250N000013 HC RX MED GY IP 250 OP 250 PS 637: Performed by: INTERNAL MEDICINE

## 2021-09-02 PROCEDURE — 84145 PROCALCITONIN (PCT): CPT | Performed by: EMERGENCY MEDICINE

## 2021-09-02 PROCEDURE — 82077 ASSAY SPEC XCP UR&BREATH IA: CPT | Performed by: EMERGENCY MEDICINE

## 2021-09-02 PROCEDURE — 80307 DRUG TEST PRSMV CHEM ANLYZR: CPT | Performed by: EMERGENCY MEDICINE

## 2021-09-02 PROCEDURE — 99207 PR CDG-CODE CATEGORY CHANGED: CPT | Performed by: INTERNAL MEDICINE

## 2021-09-02 PROCEDURE — 82247 BILIRUBIN TOTAL: CPT | Performed by: EMERGENCY MEDICINE

## 2021-09-02 PROCEDURE — 36415 COLL VENOUS BLD VENIPUNCTURE: CPT | Performed by: INTERNAL MEDICINE

## 2021-09-02 PROCEDURE — 258N000003 HC RX IP 258 OP 636: Performed by: INTERNAL MEDICINE

## 2021-09-02 PROCEDURE — 82550 ASSAY OF CK (CPK): CPT | Performed by: EMERGENCY MEDICINE

## 2021-09-02 PROCEDURE — 99285 EMERGENCY DEPT VISIT HI MDM: CPT | Mod: 25

## 2021-09-02 PROCEDURE — 84075 ASSAY ALKALINE PHOSPHATASE: CPT | Performed by: INTERNAL MEDICINE

## 2021-09-02 PROCEDURE — 80179 DRUG ASSAY SALICYLATE: CPT | Performed by: EMERGENCY MEDICINE

## 2021-09-02 PROCEDURE — 84155 ASSAY OF PROTEIN SERUM: CPT | Performed by: INTERNAL MEDICINE

## 2021-09-02 PROCEDURE — 258N000003 HC RX IP 258 OP 636: Performed by: EMERGENCY MEDICINE

## 2021-09-02 PROCEDURE — 96361 HYDRATE IV INFUSION ADD-ON: CPT

## 2021-09-02 PROCEDURE — 99207 PR APP CREDIT; MD BILLING SHARED VISIT: CPT | Performed by: INTERNAL MEDICINE

## 2021-09-02 PROCEDURE — 90791 PSYCH DIAGNOSTIC EVALUATION: CPT

## 2021-09-02 PROCEDURE — 85025 COMPLETE CBC W/AUTO DIFF WBC: CPT | Performed by: EMERGENCY MEDICINE

## 2021-09-02 RX ORDER — CLONIDINE HYDROCHLORIDE 0.1 MG/1
0.1 TABLET ORAL EVERY 8 HOURS
Status: DISCONTINUED | OUTPATIENT
Start: 2021-09-02 | End: 2021-09-02 | Stop reason: HOSPADM

## 2021-09-02 RX ORDER — AMOXICILLIN 250 MG
1 CAPSULE ORAL 2 TIMES DAILY PRN
Status: DISCONTINUED | OUTPATIENT
Start: 2021-09-02 | End: 2021-09-02 | Stop reason: HOSPADM

## 2021-09-02 RX ORDER — HALOPERIDOL 5 MG/ML
2.5-5 INJECTION INTRAMUSCULAR EVERY 6 HOURS PRN
Status: DISCONTINUED | OUTPATIENT
Start: 2021-09-02 | End: 2021-09-02 | Stop reason: HOSPADM

## 2021-09-02 RX ORDER — AMOXICILLIN 250 MG
2 CAPSULE ORAL 2 TIMES DAILY PRN
Status: DISCONTINUED | OUTPATIENT
Start: 2021-09-02 | End: 2021-09-02 | Stop reason: HOSPADM

## 2021-09-02 RX ORDER — LORAZEPAM 1 MG/1
1-2 TABLET ORAL EVERY 30 MIN PRN
Status: DISCONTINUED | OUTPATIENT
Start: 2021-09-02 | End: 2021-09-02 | Stop reason: HOSPADM

## 2021-09-02 RX ORDER — FOLIC ACID 1 MG/1
1 TABLET ORAL DAILY
Status: DISCONTINUED | OUTPATIENT
Start: 2021-09-02 | End: 2021-09-02 | Stop reason: HOSPADM

## 2021-09-02 RX ORDER — KETAMINE HYDROCHLORIDE 10 MG/ML
50 INJECTION INTRAMUSCULAR; INTRAVENOUS DAILY
COMMUNITY

## 2021-09-02 RX ORDER — LORAZEPAM 2 MG/ML
.5-1 INJECTION INTRAMUSCULAR EVERY 4 HOURS PRN
Status: DISCONTINUED | OUTPATIENT
Start: 2021-09-02 | End: 2021-09-02 | Stop reason: HOSPADM

## 2021-09-02 RX ORDER — HYDROXYZINE HYDROCHLORIDE 25 MG/1
25-50 TABLET, FILM COATED ORAL EVERY 6 HOURS PRN
Status: DISCONTINUED | OUTPATIENT
Start: 2021-09-02 | End: 2021-09-02 | Stop reason: HOSPADM

## 2021-09-02 RX ORDER — POLYETHYLENE GLYCOL 3350 17 G/17G
17 POWDER, FOR SOLUTION ORAL DAILY PRN
Status: DISCONTINUED | OUTPATIENT
Start: 2021-09-02 | End: 2021-09-02 | Stop reason: HOSPADM

## 2021-09-02 RX ORDER — CALCIUM CARBONATE 500 MG/1
1000 TABLET, CHEWABLE ORAL 4 TIMES DAILY PRN
Status: DISCONTINUED | OUTPATIENT
Start: 2021-09-02 | End: 2021-09-02 | Stop reason: HOSPADM

## 2021-09-02 RX ORDER — SODIUM CHLORIDE, SODIUM LACTATE, POTASSIUM CHLORIDE, CALCIUM CHLORIDE 600; 310; 30; 20 MG/100ML; MG/100ML; MG/100ML; MG/100ML
INJECTION, SOLUTION INTRAVENOUS CONTINUOUS
Status: DISCONTINUED | OUTPATIENT
Start: 2021-09-02 | End: 2021-09-02 | Stop reason: HOSPADM

## 2021-09-02 RX ORDER — ONDANSETRON 2 MG/ML
4 INJECTION INTRAMUSCULAR; INTRAVENOUS EVERY 6 HOURS PRN
Status: DISCONTINUED | OUTPATIENT
Start: 2021-09-02 | End: 2021-09-02 | Stop reason: HOSPADM

## 2021-09-02 RX ORDER — HYDROXYZINE PAMOATE 25 MG/1
25-50 CAPSULE ORAL EVERY 6 HOURS PRN
Status: DISCONTINUED | OUTPATIENT
Start: 2021-09-02 | End: 2021-09-02

## 2021-09-02 RX ORDER — LORAZEPAM 2 MG/ML
1-2 INJECTION INTRAMUSCULAR EVERY 30 MIN PRN
Status: DISCONTINUED | OUTPATIENT
Start: 2021-09-02 | End: 2021-09-02 | Stop reason: HOSPADM

## 2021-09-02 RX ORDER — LORAZEPAM 2 MG/ML
0.5 INJECTION INTRAMUSCULAR ONCE
Status: COMPLETED | OUTPATIENT
Start: 2021-09-02 | End: 2021-09-02

## 2021-09-02 RX ORDER — NORTRIPTYLINE HYDROCHLORIDE 50 MG/1
50 CAPSULE ORAL AT BEDTIME
Status: DISCONTINUED | OUTPATIENT
Start: 2021-09-02 | End: 2021-09-02 | Stop reason: HOSPADM

## 2021-09-02 RX ORDER — FLUMAZENIL 0.1 MG/ML
0.2 INJECTION, SOLUTION INTRAVENOUS
Status: DISCONTINUED | OUTPATIENT
Start: 2021-09-02 | End: 2021-09-02 | Stop reason: HOSPADM

## 2021-09-02 RX ORDER — ACETAMINOPHEN AND CODEINE PHOSPHATE 120; 12 MG/5ML; MG/5ML
0.35 SOLUTION ORAL DAILY
Status: DISCONTINUED | OUTPATIENT
Start: 2021-09-02 | End: 2021-09-02 | Stop reason: HOSPADM

## 2021-09-02 RX ORDER — OLANZAPINE 5 MG/1
5-10 TABLET, ORALLY DISINTEGRATING ORAL EVERY 6 HOURS PRN
Status: DISCONTINUED | OUTPATIENT
Start: 2021-09-02 | End: 2021-09-02 | Stop reason: HOSPADM

## 2021-09-02 RX ORDER — LANOLIN ALCOHOL/MO/W.PET/CERES
100 CREAM (GRAM) TOPICAL DAILY
Status: DISCONTINUED | OUTPATIENT
Start: 2021-09-02 | End: 2021-09-02 | Stop reason: HOSPADM

## 2021-09-02 RX ORDER — DESVENLAFAXINE 100 MG/1
100 TABLET, EXTENDED RELEASE ORAL DAILY
Status: DISCONTINUED | OUTPATIENT
Start: 2021-09-02 | End: 2021-09-02 | Stop reason: HOSPADM

## 2021-09-02 RX ORDER — LIDOCAINE 40 MG/G
CREAM TOPICAL
Status: DISCONTINUED | OUTPATIENT
Start: 2021-09-02 | End: 2021-09-02 | Stop reason: HOSPADM

## 2021-09-02 RX ORDER — ONDANSETRON 4 MG/1
4 TABLET, ORALLY DISINTEGRATING ORAL EVERY 6 HOURS PRN
Status: DISCONTINUED | OUTPATIENT
Start: 2021-09-02 | End: 2021-09-02 | Stop reason: HOSPADM

## 2021-09-02 RX ORDER — MULTIPLE VITAMINS W/ MINERALS TAB 9MG-400MCG
1 TAB ORAL DAILY
Status: DISCONTINUED | OUTPATIENT
Start: 2021-09-02 | End: 2021-09-02 | Stop reason: HOSPADM

## 2021-09-02 RX ADMIN — THIAMINE HCL TAB 100 MG 100 MG: 100 TAB at 14:57

## 2021-09-02 RX ADMIN — DESVENLAFAXINE SUCCINATE 100 MG: 100 TABLET, EXTENDED RELEASE ORAL at 15:45

## 2021-09-02 RX ADMIN — SODIUM CHLORIDE 1000 ML: 9 INJECTION, SOLUTION INTRAVENOUS at 05:43

## 2021-09-02 RX ADMIN — NORTRIPTYLINE HYDROCHLORIDE 50 MG: 50 CAPSULE ORAL at 15:45

## 2021-09-02 RX ADMIN — LORAZEPAM 0.5 MG: 2 INJECTION INTRAMUSCULAR; INTRAVENOUS at 02:19

## 2021-09-02 RX ADMIN — FOLIC ACID 1 MG: 1 TABLET ORAL at 14:57

## 2021-09-02 RX ADMIN — SODIUM CHLORIDE 1000 ML: 9 INJECTION, SOLUTION INTRAVENOUS at 03:15

## 2021-09-02 RX ADMIN — SODIUM CHLORIDE, POTASSIUM CHLORIDE, SODIUM LACTATE AND CALCIUM CHLORIDE: 600; 310; 30; 20 INJECTION, SOLUTION INTRAVENOUS at 14:58

## 2021-09-02 RX ADMIN — SODIUM CHLORIDE 1000 ML: 9 INJECTION, SOLUTION INTRAVENOUS at 02:19

## 2021-09-02 RX ADMIN — CLONIDINE HYDROCHLORIDE 0.1 MG: 0.1 TABLET ORAL at 14:57

## 2021-09-02 RX ADMIN — MULTIPLE VITAMINS W/ MINERALS TAB 1 TABLET: TAB at 14:57

## 2021-09-02 ASSESSMENT — ENCOUNTER SYMPTOMS
NERVOUS/ANXIOUS: 1
VOMITING: 1
HALLUCINATIONS: 1
NAUSEA: 0
WEAKNESS: 1

## 2021-09-02 NOTE — ED TRIAGE NOTES
Pt arrives from home via EMS. EMS reports pt has had 1 day of  hallucinations, vomiting, difficulty ambulating, and generalized weakness. EMS reports pt has hx of serotonin syndrome, and in the past, has presented w/ similar sx. On arrival of EMS pt was agitated, pt was placed on transport hold. On arrival to ED pt is calm and cooperative but going between laughing and crying. Pt reports constant thoughts of wanting to end their life since childhood, last attempt when they were a teenager. Of note, pt uses they/them pronouns.

## 2021-09-02 NOTE — ED NOTES
Pt searched, pt items placed in locker near security, process explained to pt who verbalized understanding. Pt reports use of alcohol 2-3 times a week having 3-4 shots of liquor. Reports use of prescribed ketamine intranasally for depression. Reports last administering 1 week ago.
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
## 2021-09-02 NOTE — ED NOTES
Bed: ED10  Expected date: 9/2/21  Expected time: 1:27 AM  Means of arrival:   Comments:  BV4 21 F Hallucinations

## 2021-09-02 NOTE — PHARMACY-ADMISSION MEDICATION HISTORY
Admission medication history interview status for this patient is complete. See [facility] admission navigator for allergy information, prior to admission medications and immunization status.     Medication history interview done, indicate source(s): Patient  Medication history resources (including written lists, pill bottles, clinic record):None  Pharmacy: [id number] - [place], [place] - [address] [place]    Changes made to PTA medication list:  Added: -  Changed: hydroxyzine (added sig)  Reported as Not Taking: -  Removed: -    Actions taken by pharmacist (provider contacted, etc):None   Additional medication history information:None  Medication reconciliation/reorder completed by provider prior to medication history?  no    Prior to Admission medications    Medication Sig Last Dose Taking? Auth Provider   desvenlafaxine (PRISTIQ) 50 MG 24 hr tablet Take 2 tablets (100 mg) by mouth daily Generic desvenlafaxine please DO NOT CRUSH. 9/1/2021 at am Yes Reyes Davis MD   hydrOXYzine (VISTARIL) 25 MG capsule Take 25-50 mg by mouth 2 times daily as needed for anxiety  Past Week at Unknown time Yes Reported, Patient   ketamine 10 mg/mL (KETALAR) intranasal solution Apply 50 mg into one nostril as directed daily  Past Week at Unknown time Yes Reported, Patient   norethindrone (MICRONOR) 0.35 MG tablet Take 0.35 mg by mouth daily 9/1/2021 at am Yes Unknown, Entered By History   nortriptyline (PAMELOR) 50 MG capsule Take 75 mg by mouth At Bedtime  8/31/2021 at hs Yes Reyes Davis MD

## 2021-09-02 NOTE — H&P
[facility]  History and Physical   Hospitalist Service    Mark Finn MD    Phil Quezada MRN# [id number]   YOB: 2000 Age: 21 year old      Date of Admission:  9/2/2021           Assessment and Plan:   Summary:  Phil Quezada is a 21-year-old female with history of depression, anxiety, cluster B personality disorder, suicidal ideation, alcohol abuse, and recent admission at the [facility] with UTI and concern about possible serotonin syndrome.  She was only hospitalized for a day (8/15/21).  She was brought to the [facility] emergency department this morning for evaluation after 1 day of auditory and visual hallucinations.  Upon arrival of paramedics she was agitated and combative.  She was diaphoretic and having nausea.  She admitted to some alcohol use.  She admitted to occasional marijuana use.  She was brought to the emergency department for evaluation.  Vital signs showed no fever, tachycardia with heart rate in the 110s to 120s, and slightly elevated blood pressures.  Pulmonary indices were unremarkable.  Exam showed altered mental status with some confusion.  She had no hyperreflexia, tremor, or clonus.  Laboratory evaluation showed , , blood alcohol level 0.42, lactic acid 3.1, and unremarkable CBC, basic metabolic panel, ammonia, urine analysis, urine toxicology, Tylenol, acetaminophen, and COVID-19 PCR.  Lactic acid came down to 2.8 after IV fluid.  Patient was given IV Ativan with improvement of agitation.  Tachycardia persisted.  I was asked to admit Phil to the hospital with acute alcohol intoxication, toxic encephalopathy with altered mental status, risk of acute alcohol withdrawal, and possible anticholinergic syndrome related to nortriptyline.    Problem list:    Acute alcohol intoxication with blood alcohol level of 0.42  Toxic encephalopathy  Alcohol abuse and dependence  Risk of acute alcohol withdrawal  Elevated  liver function tests  Lactic acidosis  Metabolic encephalopathy  Possible anticholinergic syndrome  -Admit as inpatient to telemetry  -Hydrate with lactated Ringer's 100 mL/h  -Repeat lactic acid in 3 hours  -I have ordered CIWA protocol with scheduled clonidine and as needed Ativan  -I ordered Ativan for anxiety and agitation as well  -We will somnolent when I saw her.  May ultimately need psychiatry consultation    Depression  Anxiety  Cluster B personality disorder  -Hold prior to admission medications for now  -May need psychiatry consult depending on hospital course    COVID-19 PCR was negative  Has had COVID-19 vaccine    CODE STATUS: Full code  DVT prophylaxis: Mechanical  Disposition: Admit as inpatient           Code Status:   Full Code         Primary Care Physician:   Service, [facility] None         Chief Complaint:   Hallucinations, altered mental status    History is obtained from Dr. Jenna Villarreal, and the medical record         History of Present Illness:   Phil LANDEROSCatracho MANGOCatracho Quezada is a 21-year-old female with history of depression, anxiety, cluster B personality disorder, suicidal ideation, alcohol abuse, and recent admission at the [facility] with UTI and concern about possible serotonin syndrome.  She was only hospitalized for a day (8/15/21).  She was brought to the [facility] emergency department this morning for evaluation after 1 day of auditory and visual hallucinations.  Upon arrival of paramedics she was agitated and combative.  She was diaphoretic and having nausea.  She admitted to some alcohol use.  She admitted to occasional marijuana use.  She was brought to the emergency department for evaluation.  Vital signs showed no fever, tachycardia with heart rate in the 110s to 120s, and slightly elevated blood pressures.  Pulmonary indices were unremarkable.  Exam showed altered mental status with some confusion.  She had no hyperreflexia, tremor, or clonus.   Laboratory evaluation showed , , blood alcohol level 0.42, lactic acid 3.1, and unremarkable CBC, basic metabolic panel, ammonia, urine analysis, urine toxicology, Tylenol, acetaminophen, and COVID-19 PCR.  Lactic acid came down to 2.8 after IV fluid.  Patient was given IV Ativan with improvement of agitation.  Tachycardia persisted.  I was asked to admit Phil to the hospital with acute alcohol intoxication, toxic encephalopathy with altered mental status, risk of acute alcohol withdrawal, and possible anticholinergic syndrome related to nortriptyline.           Past Medical History:     Patient Active Problem List   Diagnosis     Suicidal ideation     Serotonin syndrome     Lactic acidosis     Psychosis, unspecified psychosis type (H)      Past Medical History:   Diagnosis Date     Alcohol use      Anxiety      Cluster B personality disorder      Depressive disorder              Past Surgical History:     Past Surgical History:   Procedure Laterality Date     ENT SURGERY              Home Medications:     Prior to Admission medications    Medication Sig Last Dose Taking? Auth Provider   ketamine 10 mg/mL (KETALAR) intranasal solution Apply 50 mg into one nostril as directed daily  Yes Reported, Patient   desvenlafaxine (PRISTIQ) 50 MG 24 hr tablet Take 2 tablets (100 mg) by mouth daily Generic desvenlafaxine please DO NOT CRUSH.   Reyes Davis MD   hydrOXYzine (VISTARIL) 25 MG capsule 50 mg    Reported, Patient   norethindrone (MICRONOR) 0.35 MG tablet Take 0.35 mg by mouth daily   Unknown, Entered By History   nortriptyline (PAMELOR) 50 MG capsule Take 75 mg by mouth At Bedtime    Reyes Davis MD            Allergies:     Allergies   Allergen Reactions     Augmentin Rash            Social History:     Social History     Tobacco Use     Smoking status: Never Smoker     Smokeless tobacco: Never Used   Substance Use Topics     Alcohol use: Yes     Alcohol/week: 1.0 standard drinks     Types: 1  Shots of liquor per week     Comment: 4-6 drinks/month             Family History:   OCD  Depression  Anxiety           Review of Systems:   The 10 point Review of Systems is negative other than as noted in the HPI.           Physical Exam:   Blood pressure 113/75, pulse 111, temperature 98.9  F (37.2  C), temperature source Temporal, resp. rate 17, last menstrual period 08/24/2021, SpO2 95 %, not currently breastfeeding.  0 lbs 0 oz      GENERAL: Pleasant and cooperative. No acute distress. Somnolent, arouseable but not giving reliable history  EYES: Pupils equal and round. No scleral erythema or icterus.  ENT: External ears are normal without deformity. Posterior oropharynx is without erythem, swelling, or exudate.  NECK: Supple. No masses or swelling. No tenderness. Thyroid is normal without mass or tenderness.  CHEST: Clear to auscultation. Normal breath sounds. No retractions.   CV: Regular rate and rhythm. No JVD. Pulses normal.  ABDOMEN: Bowel sounds present. No tenderness. No masses or hernia.  EXTREMETIES: No clubbing, cyanosis, or ischemia.  SKIN: Warm and dry to touch. No wounds or rashes.  NEUROLOGIC: Strength and sensation are normal. Deep tendon reflexes are normal. Cranial nerves are normal. No tremor, clonus, or hyperreflexia             Data:   All new lab and imaging data was reviewed.     Results for orders placed or performed during the hospital encounter of 09/02/21 (from the past 24 hour(s))   EKG 12-lead, tracing only   Result Value Ref Range    Systolic Blood Pressure  mmHg    Diastolic Blood Pressure  mmHg    Ventricular Rate 122 BPM    Atrial Rate 122 BPM    CT Interval 138 ms    QRS Duration 88 ms     ms    QTc 464 ms    P Axis 15 degrees    R AXIS 2 degrees    T Axis -5 degrees    Interpretation ECG       Sinus tachycardia  Otherwise normal ECG  When compared with ECG of 15-AUG-2021 08:06,  No significant change was found  Confirmed by - EMERGENCY ROOM, PHYSICIAN (Gertrudis),   Cinthya Liz ([id number]) on 9/2/2021 6:41:25 AM     CBC with platelets differential    Narrative    The following orders were created for panel order CBC with platelets differential.  Procedure                               Abnormality         Status                     ---------                               -----------         ------                     CBC with platelets and d...[[id number]]  Abnormal            Final result                 Please view results for these tests on the individual orders.   Comprehensive metabolic panel   Result Value Ref Range    Sodium 141 133 - 144 mmol/L    Potassium 3.8 3.4 - 5.3 mmol/L    Chloride 109 94 - 109 mmol/L    Carbon Dioxide (CO2) 27 20 - 32 mmol/L    Anion Gap 5 3 - 14 mmol/L    Urea Nitrogen 7 7 - 30 mg/dL    Creatinine 0.81 0.52 - 1.04 mg/dL    Calcium 8.4 (L) 8.5 - 10.1 mg/dL    Glucose 85 70 - 99 mg/dL    Alkaline Phosphatase 78 40 - 150 U/L     (H) 0 - 45 U/L     (H) 0 - 50 U/L    Protein Total 8.3 6.8 - 8.8 g/dL    Albumin 4.5 3.4 - 5.0 g/dL    Bilirubin Total 0.3 0.2 - 1.3 mg/dL    GFR Estimate >90 >60 mL/min/1.73m2   Acetaminophen level   Result Value Ref Range    Acetaminophen <2 (L) 10 - 30 mg/L   Salicylate level   Result Value Ref Range    Salicylate 2 <20 mg/dL   Alcohol level blood   Result Value Ref Range    Alcohol ethyl 0.42 (HH) <=0.01 g/dL   CK total   Result Value Ref Range     30 - 225 U/L   Lactic acid whole blood   Result Value Ref Range    Lactic Acid 3.1 (H) 0.7 - 2.0 mmol/L   CBC with platelets and differential   Result Value Ref Range    WBC Count 3.5 (L) 4.0 - 11.0 10e3/uL    RBC Count 4.98 3.80 - 5.20 10e6/uL    Hemoglobin 14.8 11.7 - 15.7 g/dL    Hematocrit 45.1 35.0 - 47.0 %    MCV 91 78 - 100 fL    MCH 29.7 26.5 - 33.0 pg    MCHC 32.8 31.5 - 36.5 g/dL    RDW 13.2 10.0 - 15.0 %    Platelet Count 281 150 - 450 10e3/uL    % Neutrophils 49 %    % Lymphocytes 39 %    % Monocytes 7 %    % Eosinophils 3 %    % Basophils 2 %     % Immature Granulocytes 0 %    NRBCs per 100 WBC 0 <1 /100    Absolute Neutrophils 1.7 1.6 - 8.3 10e3/uL    Absolute Lymphocytes 1.3 0.8 - 5.3 10e3/uL    Absolute Monocytes 0.2 0.0 - 1.3 10e3/uL    Absolute Eosinophils 0.1 0.0 - 0.7 10e3/uL    Absolute Basophils 0.1 0.0 - 0.2 10e3/uL    Absolute Immature Granulocytes 0.0 <=0.0 10e3/uL    Absolute NRBCs 0.0 10e3/uL   Asymptomatic COVID-19 Virus (Coronavirus) by PCR Nasopharyngeal    Specimen: Nasopharyngeal; Swab   Result Value Ref Range    SARS CoV2 PCR Negative Negative    Narrative    Testing was performed using the kaycee  SARS-CoV-2 & Influenza A/B Assay on the kaycee  Maia  System.  This test should be ordered for the detection of SARS-COV-2 in individuals who meet SARS-CoV-2 clinical and/or epidemiological criteria. Test performance is unknown in asymptomatic patients.  This test is for in vitro diagnostic use under the FDA EUA for laboratories certified under CLIA to perform moderate and/or high complexity testing. This test has not been FDA cleared or approved.  A negative test does not rule out the presence of PCR inhibitors in the specimen or target RNA in concentration below the limit of detection for the assay. The possibility of a false negative should be considered if the patient's recent exposure or clinical presentation suggests COVID-19.  [facility] are certified under the Clinical Laboratory Improvement Amendments of 1988 (CLIA-88) as qualified to perform moderate and/or high complexity laboratory testing.   Urine Drugs of Abuse Screen    Narrative    The following orders were created for panel order Urine Drugs of Abuse Screen.  Procedure                               Abnormality         Status                     ---------                               -----------         ------                     Drug abuse screen 1 urin...[[id number]]  Normal              Final result                 Please view results for these tests on  the individual orders.   UA reflex to Microscopic   Result Value Ref Range    Color Urine Straw Colorless, Straw, Light Yellow, Yellow    Appearance Urine Clear Clear    Glucose Urine Negative Negative mg/dL    Bilirubin Urine Negative Negative    Ketones Urine Negative Negative mg/dL    Specific Gravity Urine 1.007 1.003 - 1.035    Blood Urine Negative Negative    pH Urine 6.0 5.0 - 7.0    Protein Albumin Urine Negative Negative mg/dL    Urobilinogen Urine Normal Normal, 2.0 mg/dL    Nitrite Urine Negative Negative    Leukocyte Esterase Urine Negative Negative    Narrative    Microscopic not indicated   Lactic acid whole blood   Result Value Ref Range    Lactic Acid 2.8 (H) 0.7 - 2.0 mmol/L   Drug abuse screen 1 urine (ED)   Result Value Ref Range    Amphetamines Urine Screen Negative Screen Negative    Barbiturates Urine Screen Negative Screen Negative    Benzodiazepines Urine Screen Negative Screen Negative    Cannabinoids Urine Screen Negative Screen Negative    Cocaine Urine Screen Negative Screen Negative    Opiates Urine Screen Negative Screen Negative   Ammonia (on ice)   Result Value Ref Range    Ammonia <10 (L) 10 - 50 umol/L

## 2021-09-02 NOTE — ED NOTES
[facility]  ED Nurse Handoff Report    Phil Quezada is a 21 year old female   ED Chief complaint: Hallucinations  . ED Diagnosis:   Final diagnoses:   Psychosis, unspecified psychosis type (H)   Lactic acidosis   Alcoholic intoxication without complication (H)     Allergies:   Allergies   Allergen Reactions     Augmentin Rash       Code Status: Full Code  Activity level - Baseline/Home:  Independent. Activity Level - Current:   Stand by Assist. Lift room needed: No. Bariatric: No   Needed: No   Isolation: No. Infection: Not Applicable.     Vital Signs:   Vitals:    09/02/21 1015 09/02/21 1030 09/02/21 1045 09/02/21 1100   BP: 120/84 116/82 (!) 148/92 (!) 141/97   Pulse: 112 108 116 117   Resp: 17 10 20 14   Temp:       TempSrc:       SpO2: 96% 95% 96% 96%       Cardiac Rhythm:  ,      Pain level:    Patient confused: No. Patient Falls Risk: No.   Elimination Status: Has voided   Patient Report - Initial Complaint: Hallucinations. Focused Assessment: Phil Quezada is a 21 year old female with history of serotonin syndrome, cluster B personality disorder, hallucinations, suicidal ideations, who presents for evaluation of hallucinations.  Per EMS report, the patient lives in an apartment with their partner and has had 1 day of auditory and visual hallucinations with vomiting, difficulty ambulating, and generalized weakness. They state patient has history of serotonin syndrome and presented similarly to this last time. On EMS arrival, patient was agitated and combative, but is now cooperative. They go between laughing and crying upon presentation.     Here, the patient reports that they sometimes have auditory and visual hallucinations, unable to provide further detail on these. The patient admits to some alcohol use tonight and occasional marijuana use. The patient denies pain, headache, cough, rhinorrhea, abdominal pain, nausea, or vomiting, but feels anxious currently. The patient is  vaccinated for COVID-19.      Review of records shows admission to the [facility] 8/14-8/15.  There was concern for possible serotonin syndrome on admission though patient also found to have UTI.  She reports currently taking nortriptyline though cannot tell how much.   Tests Performed: labs . Abnormal Results:   Labs Ordered and Resulted from Time of ED Arrival Up to the Time of Departure from the ED   COMPREHENSIVE METABOLIC PANEL - Abnormal; Notable for the following components:       Result Value    Calcium 8.4 (*)      (*)      (*)     All other components within normal limits   ACETAMINOPHEN LEVEL - Abnormal; Notable for the following components:    Acetaminophen <2 (*)     All other components within normal limits   ETHYL ALCOHOL LEVEL - Abnormal; Notable for the following components:    Alcohol ethyl 0.42 (*)     All other components within normal limits   LACTIC ACID WHOLE BLOOD - Abnormal; Notable for the following components:    Lactic Acid 3.1 (*)     All other components within normal limits   CBC WITH PLATELETS AND DIFFERENTIAL - Abnormal; Notable for the following components:    WBC Count 3.5 (*)     All other components within normal limits   LACTIC ACID WHOLE BLOOD - Abnormal; Notable for the following components:    Lactic Acid 2.8 (*)     All other components within normal limits   AMMONIA - Abnormal; Notable for the following components:    Ammonia <10 (*)     All other components within normal limits   SALICYLATE LEVEL - Normal   URINE MACROSCOPIC WITH REFLEX TO MICRO - Normal    Narrative:     Microscopic not indicated   COVID-19 VIRUS (CORONAVIRUS) BY PCR - Normal    Narrative:     Testing was performed using the kaycee  SARS-CoV-2 & Influenza A/B Assay on the kaycee  Maia  System.  This test should be ordered for the detection of SARS-COV-2 in individuals who meet SARS-CoV-2 clinical and/or epidemiological criteria. Test performance is unknown in asymptomatic  patients.  This test is for in vitro diagnostic use under the FDA EUA for laboratories certified under CLIA to perform moderate and/or high complexity testing. This test has not been FDA cleared or approved.  A negative test does not rule out the presence of PCR inhibitors in the specimen or target RNA in concentration below the limit of detection for the assay. The possibility of a false negative should be considered if the patient's recent exposure or clinical presentation suggests COVID-19.  [facility] are certified under the Clinical Laboratory Improvement Amendments of 1988 (CLIA-88) as qualified to perform moderate and/or high complexity laboratory testing.   CK TOTAL - Normal   DRUG ABUSE SCREEN 1 URINE (ED) - Normal   PROCALCITONIN - Normal   CBC WITH PLATELETS & DIFFERENTIAL    Narrative:     The following orders were created for panel order CBC with platelets differential.  Procedure                               Abnormality         Status                     ---------                               -----------         ------                     CBC with platelets and d...[[id number]]  Abnormal            Final result                 Please view results for these tests on the individual orders.   DOCUMENT IN LEGAL HOLD NAVIGATOR   URINE DRUGS OF ABUSE SCREEN    Narrative:     The following orders were created for panel order Urine Drugs of Abuse Screen.  Procedure                               Abnormality         Status                     ---------                               -----------         ------                     Drug abuse screen 1 urin...[[id number]]  Normal              Final result                 Please view results for these tests on the individual orders.     .   Treatments provided: See MAr  Family Comments: NA  OBS brochure/video discussed/provided to patient:  No  ED Medications:   Medications   0.9% sodium chloride BOLUS (0 mLs Intravenous Stopped 9/2/21 8452)    LORazepam (ATIVAN) injection 0.5 mg (0.5 mg Intravenous Given 9/2/21 3412)   0.9% sodium chloride BOLUS (0 mLs Intravenous Stopped 9/2/21 1589)   0.9% sodium chloride BOLUS (1,000 mLs Intravenous New Bag 9/2/21 9979)     Drips infusing:  No  For the majority of the shift, the patient's behavior Yellow. Interventions performed were IV ativan.    Sepsis treatment initiated: No     Patient tested for COVID 19 prior to admission: YES    ED Nurse Name/Phone Number: Monica Patricio RN,   12:42 PM

## 2021-09-02 NOTE — PROGRESS NOTES
Patient seen and examined.  Chart reviewed.  Please see admission history and physical by Dr. Finn from earlier today for details.    Continue to treat acute alcohol withdrawals.  Currently on healthcare officer hold.  Psych consult once more able to participate.

## 2021-09-02 NOTE — ED NOTES
[facility]  ED Nurse Handoff Report    Phil Quezada is a 21 year old female   ED Chief complaint: Hallucinations  . ED Diagnosis:   Final diagnoses:   Psychosis, unspecified psychosis type (H)   Lactic acidosis   Alcoholic intoxication without complication (H)     Allergies:   Allergies   Allergen Reactions     Augmentin Rash       Code Status: Full Code  Activity level - Baseline/Home:  Independent. Activity Level - Current:   Stand by Assist. Lift room needed: No. Bariatric: No   Needed: No   Isolation: No. Infection: Not Applicable.     Vital Signs:   Vitals:    09/02/21 0530 09/02/21 0545 09/02/21 0600 09/02/21 0615   BP: (!) 141/103 (!) 145/108 (!) 132/99 121/81   Pulse: 114 120 112 111   Resp: 8 16 16 17   Temp:       TempSrc:       SpO2: 98% 99% 97% 96%       Cardiac Rhythm:  ,      Pain level:    Patient confused: No. Patient Falls Risk: Yes.   Elimination Status: Has voided   Patient Report - Initial Complaint: AMS. Focused Assessment: Pt arrives from home via EMS. EMS reports pt has had 1 day of  hallucinations, vomiting, difficulty ambulating, and generalized weakness. EMS reports pt has hx of serotonin syndrome, and in the past, has presented w/ similar sx. On arrival of EMS pt was agitated, pt was placed on transport hold. On arrival to ED pt is calm and cooperative but going between laughing and crying. Pt reports constant thoughts of wanting to end their life since childhood, last attempt when they were a teenager. Of note, pt uses they/them pronouns   Tests Performed:   Labs Ordered and Resulted from Time of ED Arrival Up to the Time of Departure from the ED   COMPREHENSIVE METABOLIC PANEL - Abnormal; Notable for the following components:       Result Value    Calcium 8.4 (*)      (*)      (*)     All other components within normal limits   ACETAMINOPHEN LEVEL - Abnormal; Notable for the following components:    Acetaminophen <2 (*)     All other  components within normal limits   ETHYL ALCOHOL LEVEL - Abnormal; Notable for the following components:    Alcohol ethyl 0.42 (*)     All other components within normal limits   LACTIC ACID WHOLE BLOOD - Abnormal; Notable for the following components:    Lactic Acid 3.1 (*)     All other components within normal limits   CBC WITH PLATELETS AND DIFFERENTIAL - Abnormal; Notable for the following components:    WBC Count 3.5 (*)     All other components within normal limits   LACTIC ACID WHOLE BLOOD - Abnormal; Notable for the following components:    Lactic Acid 2.8 (*)     All other components within normal limits   AMMONIA - Abnormal; Notable for the following components:    Ammonia <10 (*)     All other components within normal limits   SALICYLATE LEVEL - Normal   URINE MACROSCOPIC WITH REFLEX TO MICRO - Normal    Narrative:     Microscopic not indicated   COVID-19 VIRUS (CORONAVIRUS) BY PCR - Normal    Narrative:     Testing was performed using the kaycee  SARS-CoV-2 & Influenza A/B Assay on the kaycee  Maia  System.  This test should be ordered for the detection of SARS-COV-2 in individuals who meet SARS-CoV-2 clinical and/or epidemiological criteria. Test performance is unknown in asymptomatic patients.  This test is for in vitro diagnostic use under the FDA EUA for laboratories certified under CLIA to perform moderate and/or high complexity testing. This test has not been FDA cleared or approved.  A negative test does not rule out the presence of PCR inhibitors in the specimen or target RNA in concentration below the limit of detection for the assay. The possibility of a false negative should be considered if the patient's recent exposure or clinical presentation suggests COVID-19.  [facility] are certified under the Clinical Laboratory Improvement Amendments of 1988 (CLIA-88) as qualified to perform moderate and/or high complexity laboratory testing.   CK TOTAL - Normal   DRUG ABUSE SCREEN 1  URINE (ED) - Normal   CBC WITH PLATELETS & DIFFERENTIAL    Narrative:     The following orders were created for panel order CBC with platelets differential.  Procedure                               Abnormality         Status                     ---------                               -----------         ------                     CBC with platelets and d...[[id number]]  Abnormal            Final result                 Please view results for these tests on the individual orders.   PROCALCITONIN   DOCUMENT IN LEGAL HOLD NAVIGATOR   URINE DRUGS OF ABUSE SCREEN    Narrative:     The following orders were created for panel order Urine Drugs of Abuse Screen.  Procedure                               Abnormality         Status                     ---------                               -----------         ------                     Drug abuse screen 1 urin...[[id number]]  Normal              Final result                 Please view results for these tests on the individual orders.     . Abnormal Results: Lactic, 3.1, ETOH 0.42.   Treatments provided:   No orders to display     Family Comments: Pt contacted  OBS brochure/video discussed/provided to patient:  No  ED Medications:   Medications   0.9% sodium chloride BOLUS (1,000 mLs Intravenous New Bag 9/2/21 0543)   0.9% sodium chloride BOLUS (0 mLs Intravenous Stopped 9/2/21 0315)   LORazepam (ATIVAN) injection 0.5 mg (0.5 mg Intravenous Given 9/2/21 0219)   0.9% sodium chloride BOLUS (0 mLs Intravenous Stopped 9/2/21 0358)     Drips infusing:  No  For the majority of the shift, the patient's behavior Yellow. Interventions performed were Water, warm blankets.    Sepsis treatment initiated:   Yes    Per the ED Provider, Time Zero for severe sepsis or septic shock is:  0230    3 Hour Severe Sepsis Bundle Completion:  1. Initial Lactic Acid Result: Recent Labs   Lab Test 09/02/21  0405 09/02/21  0218 06/24/20  2058   LACT 2.8* 3.1* 1.5     2. Blood Cultures before Antibiotics:  No abx at this time  3. Broad Spectrum Antibiotics Administered:     Anti-infectives (From now, onward)    None        4. 3000 ml of IV fluids have been given so far      6 Hour Severe Sepsis Bundle Completion:    1. Repeat Lactic Acid Level:   Last result   Lab Results   Component Value Date    LACT 2.8 (H) 09/02/2021     2. Patient currently on Vasopressors =  No     Patient tested for COVID 19 prior to admission: YES- negative    ED Nurse Name/Phone Number: Catarina Fonseca RN,   6:23 AM

## 2021-09-02 NOTE — ED NOTES
Pt ambulated to bathroom independently. Was able to provide samples. Pt calm and cooperative. Gave pt water.

## 2021-09-03 ENCOUNTER — PATIENT OUTREACH (OUTPATIENT)
Dept: CARE COORDINATION | Facility: CLINIC | Age: 21
End: 2021-09-03

## 2021-09-03 DIAGNOSIS — Z71.89 OTHER SPECIFIED COUNSELING: ICD-10-CM

## 2021-09-03 NOTE — ED NOTES
Patient was signed out to me by Dr. Grace.  In brief patient is a 21-year-old female who presents with altered mental status in the setting of acute alcohol intoxication.  Concern for ongoing hallucinations, tachycardia, and LFT elevations.  Patient had been admitted to the hospitalist service, although pending bed placement.  Unfortunately no bed was available throughout the course of my shift and ultimately I spoke with Dr. Williamson of the hospitalist service who is helping to manage the patient and we elected in a shared decision to obtain a mental health assessment while the patient was in the emergency department.  DEC evaluated the patient and felt comfortable with plans for outpatient follow-up with her psychiatrist and therapist.  [facility] will contact the patient tomorrow to schedule an outpatient therapy appointment.  Patient agreed to a verbal safety plan with the DEC .  Patient continued to have some mild tachycardia and hypertension although this is consistent with vital signs obtained during her most recent hospital discharge.  Patient would like to discharge home at this time and both myself and Dr. Williamson felt comfortable with this plan.  She will follow close with her primary care provider and outpatient psychiatric team.    Please see formal hospitalist discharge summary for additional recommendations and medical decision making.      ICD-10-CM    1. Psychosis, unspecified psychosis type (H)  F29    2. Lactic acidosis  E87.2    3. Alcoholic intoxication without complication (H)  F10.920             Johnny Maldonado MD  09/02/21 2277

## 2021-09-03 NOTE — PROGRESS NOTES
Clinic Care Coordination Contact  [facility]/Voicemail    Clinical Data: Care Coordinator Outreach - TCM     Outreach attempted x 1.  Left message on patient's voicemail with [facility]'s 24/7 nurse triage/central scheduling phone number should she have questions/concerns, and a care coordinator will try to reach her again tomorrow.    Plan:  Care Coordinator will try to reach patient again in 1-2 business days.      Moraima Staples RN  [facility], [facility]

## 2021-09-03 NOTE — ED NOTES
"..9/2/2021  Phil Quezada 2000     [facility] Crisis Assessment:    Started at: 8:33  Completed at: 8:53  Patient was assessed via virtually ([employer] cart or other teleconferencing device).    Chief Complaint and History of Presenting Problem:    Patient is a 21 year old  female who presented to the ED by Medics related to concerns for intoxication and hallucinations.  Phil reports she has been taking the same medications and dosage for at least 8 months.  She report recently she was changed from generic Pristiq to the brand name.  She reports she immediately noticed a change in her mood and ask her psychiatrist to change her back which he did.   Yesterday, she reports she had 3-4 drinks on a empty stomach and began to experience auditory and visual hallucinations.  She reports she was \"disconnected\" from reality.  She experienced Serotonin Syndrome 2 weeks ago and has had  hallucinations in the past but nothing recently.   She reports her diagnosis as MDD, CORNELIO, PTSD.  She reports symptoms of anxiety which include trouble sleeping, racing thoughts and panic attacks.  She denies symptoms of depression.  She has experienced suicidal ideation in the past but denies current thoughts, plan or intent.      Assessment and intervention involved meeting with pt, obtaining collateral from [facility] and Care Everywhere records.   employing crisis psychotherapy including: Active listening, Establish a discharge plan and Brief Supportive Therapy. Collateral information includes review of Epic .     Biopsychosocial Background and Demographic Information    Lives with her girlfriend, Tasha and her emotional support cat    Mental Health History and Current Symptoms     She reports a long history of MDD stating it began in child [place].   She reports she has been admitted into the \"psych tian\" numerous times as an adolescent.   She denies past suicide attempts but admits to [facility] as an adolescent.      Patient identifies historical " diagnoses of MDD,CORNELIO, PTSD. At baseline, patient describes their mental health symptoms as manageable, more good days than bad.     Mental Health History (prior psychiatric hospitalizations, civil commitments, programmatic care, etc):Reports multiple hospitalizations as an adolescent.    Family Mental and Chemical Health History: Declined to answer    Current and Historic Psychotropic Medications: Prystiq, Nortriptyline, Ketamine   Medication Adherent: Yes  Recent medication changes? Yes    Relevant Medical Concerns  Patient identifies concerns with completing ADLs? Yes  Patient can ambulate independently? Yes  Other medical health concerns? No  History of concussion or TBI? No     Trauma History   Physical, Emotional, or Sexual abuse: reports history of PTSD but didn't want to discuss  Loss of a friend or family member to suicide: Did not assess  Other identified traumatic event or significant stressor: Reports diagnosis of PTSD but did not want to discuss history of trauma     Substance Use History and Treatments  Reports drinking one drink after work most days    Drug screen/BAL/Breathalyzer Completed? Yes  Results: .44     History of Suicidal Ideation, Suicide Attempts, Non-Suicidal Self Injury, and Risk Formulation:   Details of Current Ideation, Attempt(s), Plan(s): Denies   Risk factors:alcohol use, past ideation history of or current substance use.   Protective factors: supportive relationship and insight strong bond to family/friends, community support, employment, responsibilities to others (spouse, pets, children, etc.), positive working relationship with existing medical/mental health providers, engaged and/or invested in treatment and future oriented towards goals, hopes, dreams.  History and Prior Methods of Self-injury: Reports she hasn't self injured since she was an adolscent  History of Suicide Attempts: denies attempts    ESS-6  1.a. Over the past 2 weeks, have you had thoughts of killing  yourself? No   1.b. Have you ever attempted to kill yourself and, if yes, when did this last happen? No  2. Recent or current suicide plan? No  3. Recent or current intent to act on ideation? No  4. Lifetime psychiatric hospitalization? Yes  5. Pattern of excessive substance use? Yes  6. Current irritability, agitation, or aggression? No  ESS-6 Score: moderate      Other Risk Areas  Aggressive/assumptive/homicidal risk factors: No   Sexually inappropriate behavior? No      Vulnerability to sexual exploitation? No     Clinical Presentation and Current Symptoms   Phil was calm and cooperative and actively participated in the assessment.   Affect was appropriate.  Eye contact was engaged.  She was orientated x 4.  Speech was fluent and coherent.      Attention, Hyperactivity, and Impulsivity: No   Anxiety:Yes: Panic attacks and Generalized Symptoms: Cognitive anxiety - feelings of doom, racing thoughts, difficulty concentrating , Physiological anxiety - sweating, flushing, shaking, shortness of breath, or racing heart and Somatic symptoms - abdominal pain, headache, or tension    Behavioral Difficulties: No   Mood Symptoms: Yes: Sleep disturbance    Appetite: No   Feeding and Eating: No  Interpersonal Functioning: No  Learning Disabilities/Cognitive/Developmental Disorders: No   General Cognitive Impairments: No  If yes, see completed Mini-Cog Assessment below.  Sleep: Yes: Difficulty falling asleep    Psychosis: Yes: Hallucinations: Auditory and Visual    Trauma: Yes: Alterations in arousal/reactivity: Sleep disturbance       Mental Status Exam:  Affect: Appropriate  Appearance: Disheveled   Attention Span/Concentration: Attentive    Eye Contact: Engaged  Fund of Knowledge: Appropriate   Language /Speech Content: Fluent  Language /Speech Volume: Normal   Language /Speech Rate/Productions: Articulate   Recent Memory: Intact  Remote Memory: Intact and Poor  Mood: Anxious   Orientation:   Person: Yes   Place: Yes and  No  Time of Day: Yes   Date: Yes   Situation (Do they understand why they are here?): Yes   Psychomotor Behavior: Normal   Thought Content: Clear  Thought Form: Intact        Current Providers and Contact Information   Patient is her own legal guardian.     Primary Care Provider: Yes, provider details not recalled  Psychiatrist: Yes, Ravinder Ewing  Therapist: No  : No  CTSS or ARMHS: No  ACT Team: No  Other: No    Has an JENNIFER been signed? Yes ; For Phil Pilar; By: self; Relationship to patient self.     Clinical Summary and Recommendations    Clinical summary of assessment (include strengths, protective factors, community resources, and assessment of vulnerability/risk): Phil appears to a be an insightful young woman in regards to her mental health.  She is an advocate for herself and desires to be mentally stable.  She has a supportive partner and brother and works.   She enjoys playing video games and has a emotional support cat.   She reports to drinking one drink per evening but last night reports 3-4 drinks which she believes contributed to her hallucinations.  She denies suicidal or homicidal thoughts, plan or intent.          Diagnosis with F Codes:  F33.2 Major Depressive Disorder, recurrent, severe   F41.1 Generalized Anxiety Disorder   F43.10 Posttraumatic Stress Disorder   Disposition  Attending provider, Dr. Maldonado consulted and does  agree with recommended disposition which includes Individual Therapy and Medication Management. Patient agrees with recommended level of care.      Details of final disposition include: Individual therapy  and Medication management.      If Inpatient, is patient admitted voluntary? N/A   Patient aware of potential for transfer if there is not appropriate placement? NA  Patient is willing to travel outside of the [facility] for placement? NA   Central Intake Notified? NA  If Discharging, what are follow up needs? Patient would like a follow up call from [facility] coordinators  to assist with setting up therapy  Safety/after care plan provided to Patient by [facility]    Duration of assessment time: .50 hrs    CPT code(s) utilized: 44797, up to 74 minutes      JASMYNE Morin

## 2021-09-03 NOTE — DISCHARGE INSTRUCTIONS
..If I am feeling unsafe or I am in a crisis, I will:   ..[facility] Crisis Line Number: [phone]   Contact my established care providers   Call the National Suicide Prevention Lifeline: 643.352.4752   Go to the nearest emergency room   Call 519          Warning signs that I or other people might notice when a crisis is developing for me:  anxiety, hallunications    Things I am able to do on my own to cope or help me feel better: play video games, spend time with Tasha and my cat    Things that I am able to do with others to cope or help me better: play video games    Things I can use or do for distraction: video games, my cat, my girlfriend    Changes I can make to support my mental health and wellness: Start seeing a therapist    People in my life that I can ask for help: My brother and girlfirend    Your [facility] has a mental health crisis team you can call 24/7: ..[facility] Crisis Line Number: [phone]

## 2021-09-03 NOTE — DISCHARGE SUMMARY
[facility] Discharge Summary    Phil Quezada MRN# [id number]   Age: 21 year old YOB: 2000     Date of Admission:  9/2/2021  Date of Discharge::  9/2/2021  Admitting Physician:  Mark Finn MD  Discharge Physician:  Edward Williamson MD             Admission Diagnoses:   Lactic acidosis [E87.2]          Discharge Diagnosis:   Principal Problem:    Hallucinations, visual  Active Problems:    Lactic acidosis    Psychosis, unspecified psychosis type (H)    Alcohol intoxication with delirium     Personality disorder (H)            Procedures:   None          Discharge Medications:     Current Discharge Medication List      CONTINUE these medications which have NOT CHANGED    Details   desvenlafaxine (PRISTIQ) 50 MG 24 hr tablet Take 2 tablets (100 mg) by mouth daily Generic desvenlafaxine please DO NOT CRUSH.  Qty: 60 tablet, Refills: 0    Associated Diagnoses: Major depressive disorder, recurrent episode, in full remission (H)      hydrOXYzine (VISTARIL) 25 MG capsule Take 25-50 mg by mouth 2 times daily as needed for anxiety       ketamine 10 mg/mL (KETALAR) intranasal solution Apply 50 mg into one nostril as directed daily       norethindrone (MICRONOR) 0.35 MG tablet Take 0.35 mg by mouth daily      nortriptyline (PAMELOR) 50 MG capsule Take 75 mg by mouth At Bedtime                    Consultations:   Consultation during this admission received from DEC.         Hospital Course:   Ms. Quezada presented to the emergency department on 9-2-2021 just after midnight apparently with visual and auditory hallucinations, vomiting difficulty ambulating and generalized weakness. She has a history of psychiatric illness and is on a number of medications for which there is been some concern that she may have serotonin syndrome. Most notably however the patient was significantly intoxicated at the time of evaluation.    I refer the reader to full history and physical examination by Stanley  "MD Aakash as well as the emergency room physician for details about initial admission. Unfortunately, Ms. Quezada was put on a health officer hold in the emergency department, waited for a bed all day and ultimately was in the emergency department when I was asked to see her.    I was on call the evening of the patient's discharge, 9-3-2021. I met with her and found her to be hemodynamically stable and coherent. She was present in the emergency department with her partner, \"Tasha\". The patient was advocating for discharge home and her partner, with whom she lives, indicated that she could also commit to keeping the patient safe.     I discussed the case with Dr. Maldonado in the emergency department. We agreed that if the deck assessment did not indicate any need for the patient to be held for further psychiatric purposes, that she could be discharged home. When the deck assessment returned indicating that they felt the patient was also safe from a psychiatric standpoint, I arranged for patient's discharge from the emergency department.    BP (!) 143/92   Pulse 120   Temp 98.9  F (37.2  C) (Temporal)   Resp 16   LMP 08/24/2021   SpO2 100%    At the time of discharge, Ms. Quezada is alert, coherent and in NAD.  She is with her partner here who indicates that the will be together in the event that  has any recurrence of problematic symptoms.             Discharge Instructions and Follow-Up:   Discharge diet: Regular  And NO ALCOHOL   Discharge activity: Activity as tolerated   Discharge follow-up: With Psychaitry as planned.           Discharge Disposition:   Discharged to home      Attestation:  I have reviewed today's vital signs, notes, medications, labs and imaging.    Edward Williamson MD     "

## 2021-09-04 ENCOUNTER — PATIENT OUTREACH (OUTPATIENT)
Dept: CARE COORDINATION | Facility: CLINIC | Age: 21
End: 2021-09-04

## 2021-09-04 NOTE — PROGRESS NOTES
Clinic Care Coordination Contact  [facility]/Voicemail       Clinical Data: Care Coordinator Outreach  Outreach attempted x 2.  Left message on patient's voicemail with call back information and requested return call.  Plan:  Care Coordinator will do no further outreaches at this time.    Kailash Garcia  Community Health Worker  [facility]  Ph:[phone]

## 2025-06-03 NOTE — DISCHARGE INSTRUCTIONS
Please make an appointment to follow up as directed by the SARS nurse and with Your Primary Care Provider as needed.  
calm